# Patient Record
Sex: MALE | Race: WHITE | NOT HISPANIC OR LATINO | Employment: STUDENT | URBAN - METROPOLITAN AREA
[De-identification: names, ages, dates, MRNs, and addresses within clinical notes are randomized per-mention and may not be internally consistent; named-entity substitution may affect disease eponyms.]

---
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
## 2022-01-03 ENCOUNTER — OFFICE VISIT (OUTPATIENT)
Dept: PSYCHOLOGY | Facility: CLINIC | Age: 22
End: 2022-01-03
Payer: COMMERCIAL

## 2022-01-03 DIAGNOSIS — F31.32 BIPOLAR 1 DISORDER, DEPRESSED, MODERATE (HCC): Primary | ICD-10-CM

## 2022-01-03 DIAGNOSIS — F90.0 ATTENTION DEFICIT HYPERACTIVITY DISORDER, INATTENTIVE TYPE: ICD-10-CM

## 2022-01-03 PROCEDURE — G0177 OPPS/PHP; TRAIN & EDUC SERV: HCPCS

## 2022-01-03 PROCEDURE — G0410 GRP PSYCH PARTIAL HOSP 45-50: HCPCS

## 2022-01-03 NOTE — PSYCH
Subjective:     Patient ID: Mena Larios is a 25 y o  male  [facility] Clinical Progress Notes      Specialized Services Documentation  Therapist must complete separate progress note for each specific clinical activity in which the individual participated during the day  Group Psychotherapy  ([id number]) Daryl engaged in an open-discussion process group  The group first started with working through a handout on what is a Dialectic before finishing a handout on Goals of Skills training  Then the group talked about the good and the bad from their extended holiday weekend  The group also talked about the skills they have been learning and how practicing them at home has been going  Clint Jolly will continue with life skills and psychotherapy groups  Some progress made towards treatment   Tx Plan Objective: 1 1,1 2 Therapist:  CARLOS EDUARDO Bradley

## 2022-01-03 NOTE — PSYCH
Subjective:     Patient ID: Benja Rachel is a 25 y o  male  [facility] Clinical Progress Notes      Specialized Services Documentation  Therapist must complete separate progress note for each specific clinical activity in which the individual participated during the day  Group Psychotherapy (9:30-10:30) Benja Rachel was present for this group on making changes  The group read the poem, An Autobiography in [address] [place] [address] by  Juana Claudio and discussed how it pertains to their lives and what chapter they find themselves in currently  Participants discussed the different themes in the poem such as blaming others for problems, continuing in bad habits and the simple choice of taking a new path  Participants went on to read and discuss different thought patterns that contribute to resistance to change; delay, denial, rationalizing, avoidance, chosing not to counter negative thoughts, lacking necessary skills and payoffs for unhealthy behavior  Abi Matson was attentive during this group and participated in group discussion  Abi Matson stated that he feels he is at the point of taking a new path when it comes to change  Abi Matson discussed his decision and need to move out on his own and work due to toxic family relationships and lended support and advise to others surrounding boundaries during this group  Abi Huyen did acknowledge at times he does blame others for his situation     Tx Goal Objective: 1 1,1 2  Therapist: LENORA López

## 2022-01-03 NOTE — PSYCH
Subjective:     Patient ID: eKtan Medina is a 25 y o  male  [facility] Clinical Progress Notes      Specialized Services Documentation  Therapist must complete separate progress note for each specific clinical activity in which the individual participated during the day  GROUP PSYCHOTHERAPY ([id number]) The group completed a sleep activity that reviewed sleeping soundly suggestions  They explored positive sleep hygiene tips and addressed what to do if they cant sleep or sleep is interrupted  Members received multiple sleep logs to begin to explore and track their sleep patters and identify where they could incorporate the healthy tips  Members were educated on resources for meditations and stretches to implement into their sleep routine to release tension and sleep easier  Kashif Cervantes discussed his routines and habits when attempting to go to sleep  Discussed where some changes could be made  Ketan Medina continues to make progress towards goals through verbal and activity participation in group  Continue with psychotherapy  [facility] Objectives: 1 1, 1 2, 1 4   Therapist: Georgette Hussein MA, [facility]       Education Therapy   [id number] Ketan Medina actively shared in morning assessment and goal review  Presented as Receptive related to readiness to learn  Ketan Medina did not complete goal from last treatment day identifying hoping to gain peace  did not present with any barriers to learning  [id number] Daryl Cantor engaged throughout the treatment day  Was engaged in learning related to Illness, Medication, Aftercare and Wellness Tools  Staff utilized Verbal, Written, A/V and Demonstration teaching methods  Ketan Medina shared area of learning and set a goal for outside of program to working on 3D model and getting a good night sleeps        Tx Plan Objective: 1 1, 1 2, 1 4 Therapist:  Georgette Hussein MA, [facility]       Case Management Note    Georgette Hussein MA, KIM    Current suicide risk : Low     A case management session is not scheduled today with Jason Nakia; additionally, they did not request a CM meeting  Next scheduled session is 01/04/22  Medications changes/added/denied? No    Treatment session number: 2    Individual Case Management Visit provided today?  No    [facility] follow up physician's orders: None at this time

## 2022-01-04 ENCOUNTER — OFFICE VISIT (OUTPATIENT)
Dept: PSYCHOLOGY | Facility: CLINIC | Age: 22
End: 2022-01-04
Payer: COMMERCIAL

## 2022-01-04 DIAGNOSIS — F90.0 ATTENTION DEFICIT HYPERACTIVITY DISORDER, INATTENTIVE TYPE: ICD-10-CM

## 2022-01-04 DIAGNOSIS — F31.32 BIPOLAR 1 DISORDER, DEPRESSED, MODERATE (HCC): Primary | ICD-10-CM

## 2022-01-04 PROCEDURE — G0176 OPPS/PHP;ACTIVITY THERAPY: HCPCS

## 2022-01-04 PROCEDURE — G0410 GRP PSYCH PARTIAL HOSP 45-50: HCPCS

## 2022-01-04 PROCEDURE — G0177 OPPS/PHP; TRAIN & EDUC SERV: HCPCS

## 2022-01-04 NOTE — PSYCH
Subjective:     Patient ID: Ever Awan is a 25 y o  male  [facility] Clinical Progress Notes      Specialized Services Documentation  Therapist must complete separate progress note for each specific clinical activity in which the individual participated during the day  Group Psychotherapy ([id number]) Rosenda Andrade engaged in a group where we discussed the importance of movement in regard to our holistic health and wellness  Talking about how mental health and physical health are connected  After the processing Daryl engaged in a physical activity of chair yoga focusing on Mindfulness and body awareness  Rosenda nAdrade will continue with life skills and psychotherapy groups  Good progress made towards treatment   Tx Plan Objective: 1 1,1 2 Therapist:  CARLOS EDUARDO May

## 2022-01-04 NOTE — PSYCH
Subjective:     Patient ID: Benja Rachel is a 25 y o  male  [facility] Clinical Progress Notes      Specialized Services Documentation  Therapist must complete separate progress note for each specific clinical activity in which the individual participated during the day  Allied Therapy   [id number]--  Benja Rachel participated in [facility] group focused on engaging in a meaningful activity to connect with other group members in a safe environment  Group members took turns to draw a category from a deck (I e  A song I listen to get me motivated in the morning) and had to share a song with the group  Group engaged in meaningful discussion about the song such as likes, dislikes, and prompted questions from the therapist for further reflection  Benja Rachel engaged in  music listening and group discussion  Abi Matson frequently participated within group and appeared engaged as observed by his commentary and physiological responses to the music (I e  bopping his head, tapping toes, etc )  Some effort noted toward treatment goal  Continue AT to encourage the development and practice of utilizing mindfulness techniques to alleviate symptoms and support wellness    Tx Plan Objective: 1 1,  1 2, & 1 4      Therapist:  YOHAN Alford

## 2022-01-04 NOTE — PSYCH
Subjective:     Patient ID: Abelardo Russ is a 25 y o  male  [facility] Clinical Progress Notes      Specialized Services Documentation  Therapist must complete separate progress note for each specific clinical activity in which the individual participated during the day  GROUP PSYCHOTHERAPY (9088-8377)   The group engaged in open discussions that allowed for conversations on the following topics; education on locus of control, navigating situations that feel out of control, managing negative thoughts, addressing perception of normal and being healthy and regaining control over life  Sherron Nazario shared that he continues to have frustrations with his mom and living situation  Other members encouraged kindness and making moves to move out to improve the relationships  Encouraged to decrease the attention on things he can't control and work on the things he can  Sherron Nazario demonstrated progress towards goals and objectives through group participation  Continue with psychotherapy  [facility] Objectives: 1 1, 1 2, 1 4   Therapist: Carlos Montgomery MA, [place]     Education Therapy   [id number] Abelardo Russ actively shared in morning assessment and goal review  Presented as Receptive related to readiness to learn  Abelardo Russ did complete goal from last treatment day identifying gaining sleep and rest  did not present with any barriers to learning  [id number] Daryl Cantor engaged throughout the treatment day  Was engaged in learning related to Illness, Medication, Aftercare and Wellness Tools  Staff utilized Verbal, Written, A/V and Demonstration teaching methods  Abelardo Russ shared area of learning and set a goal for outside of program to mediation and finish 3D model        Tx Plan Objective: 1 1, 1 2, 1 4 Therapist:  Carlos Montgomery MA, [place]         Case Management Note    Carlos Montgomery MA, [place]    Current suicide risk : Low     (7849-7197) Sherron Nazario stated that he has been doing well and enjoying program  Darío Hopkins he has been working on his resume in order to get a job after program is over  Medications changes/added/denied? No    Treatment session number: 3    Individual Case Management Visit provided today?  Yes     [facility] follow up physician's orders: None at this time

## 2022-01-05 ENCOUNTER — OFFICE VISIT (OUTPATIENT)
Dept: PSYCHIATRY | Facility: CLINIC | Age: 22
End: 2022-01-05
Payer: COMMERCIAL

## 2022-01-05 ENCOUNTER — OFFICE VISIT (OUTPATIENT)
Dept: PSYCHOLOGY | Facility: CLINIC | Age: 22
End: 2022-01-05
Payer: COMMERCIAL

## 2022-01-05 DIAGNOSIS — F31.32 BIPOLAR 1 DISORDER, DEPRESSED, MODERATE (HCC): Primary | ICD-10-CM

## 2022-01-05 DIAGNOSIS — F90.0 ATTENTION DEFICIT HYPERACTIVITY DISORDER, INATTENTIVE TYPE: ICD-10-CM

## 2022-01-05 PROCEDURE — G0176 OPPS/PHP;ACTIVITY THERAPY: HCPCS

## 2022-01-05 PROCEDURE — 99214 OFFICE O/P EST MOD 30 MIN: CPT | Performed by: NURSE PRACTITIONER

## 2022-01-05 PROCEDURE — G0177 OPPS/PHP; TRAIN & EDUC SERV: HCPCS

## 2022-01-05 PROCEDURE — G0410 GRP PSYCH PARTIAL HOSP 45-50: HCPCS

## 2022-01-05 RX ORDER — QUETIAPINE FUMARATE 50 MG/1
50 TABLET, FILM COATED ORAL
COMMUNITY
End: 2022-06-02

## 2022-01-05 RX ORDER — FLUOXETINE 10 MG/1
10 CAPSULE ORAL DAILY
COMMUNITY
End: 2022-01-12 | Stop reason: SDUPTHER

## 2022-01-05 NOTE — PSYCH
Subjective:     Patient ID: Cm Hare is a 25 y o  male  [facility] Clinical Progress Notes      Specialized Services Documentation  Therapist must complete separate progress note for each specific clinical activity in which the individual participated during the day  Group Psychotherapy (4217-1564) Charly Staley engaged in an education group on medication and the importance in understanding our medication, creating a schedule for taking our medication, and knowing the possible side effects to our medication   After, processing the power point presentation on medications Daryl then engaged in a mindfulness activity of Progressive muscle relaxation   Charly Staley will continue with life skills and psychotherapy groups   Some progress made towards treatment    Tx Plan Objective: 1 1,1 2 Therapist:  CARLOS EDUARDO Doan

## 2022-01-05 NOTE — PSYCH
Subjective:     Patient ID: Benja Rachel is a 25 y o  male  [facility] Clinical Progress Notes      Specialized Services Documentation  Therapist must complete separate progress note for each specific clinical activity in which the individual participated during the day  GROUP PSYCHOTHERAPY (4095-2522)   The group engaged in open discussions that allowed for conversations on the following topics; managing high and low emotions, recognizing that emotions are a regular part of living and analyzing coping skills to manage early warning signs  Members were asked to rate themselves on a scale from 0-10 (0 = lowest, 10 = best) to identify where they are at in their wellness journey  Then asked what would increasing to the next number look like  Abi Matson shared that he would rate himself at a 5  He was inattentive and sleeping within the group  Had to be prompted to group discussion  Abi Matson demonstrated progress towards goals and objectives through group participation  Continue with psychotherapy  [facility] Objectives: 1 1, 1 2, 1 4   Therapist: Andrew Renteria [place]       Education Therapy   [id number] Benja Rachel quietly shared in morning assessment and goal review  Presented as Other uninterested related to readiness to learn  Benja Rachel did complete goal from last treatment day identifying gaining hope  did not present with any barriers to learning  [id number] Daryl Cantor engaged throughout the treatment day  Was engaged in learning related to Illness, Medication, Aftercare and Wellness Tools  Staff utilized Verbal, Written, A/V and Demonstration teaching methods  Benja Rachel shared area of learning and set a goal for outside of program to use wise mind  Tx Plan Objective: 1 1, 1 2, 1 4 Therapist:  Triny Abreu MA, [place]       Case Management Note    Triny Abreu MA, [place]    Current suicide risk : Low     ([id number]) Daryl shared that he feels program is going well   He would like to continue to work on self love and decreasing his negative self talk  He continues to work on his resume  He brought up discharge and his plan to get a job once he is done  He stated he will reach out to his therapist to schedule follow up appointments  Medications changes/added/denied? No    Treatment session number: 4    Individual Case Management Visit provided today?  Yes     [facility]ations follow up physician's orders: None at this time

## 2022-01-05 NOTE — PSYCH
PHP MEDICATION MANAGEMENT NOTE        [facility]    Name and Date of Birth:  Zaina Lord 25 y o  2000 MRN: [id number]    Date of Visit: January 5, 2022    Allergies   Allergen Reactions    Penicillins Rash     SUBJECTIVE:    Francisco Verduzco is seen today for a follow up for Bipolar Disorder  He reports that he has improved slightly since beginning PHP  States that he has found the group support helpful coping with anger and depression  Currently rates depression is 3/10  Denies anxiety  States he has been taking his medications regularly  Reviewed patient's current medications with the patient and reviewed last note from Dr Jonathan Marie, patients provider  Updated current medication list   Patient denies any thoughts of self-harm or suicide  He denies any side effects from medications  PLAN:  Continue Strattera 40 mg p o  Daily  Continue Wellbutrin  mg p o  Daily  Continue Prozac 30 mg p o   Daily  Continue Tegretol 200 mg p o  B i d   Continue quetiapine 50 mg p o  HS  Medication list updated  Aware of 24 hour and weekend coverage for urgent situations accessed by calling [facility] main practice number  Continue partial hospitalization program    Diagnoses and all orders for this visit:    Bipolar 1 disorder, depressed, moderate ([facility] [place] [address] )        Current Outpatient Medications on File Prior to Visit   Medication Sig Dispense Refill    atoMOXetine (STRATTERA) 40 mg capsule TAKE 1 CAPSULE BY MOUTH EVERY DAY 90 capsule 0    buPROPion (WELLBUTRIN XL) 300 mg 24 hr tablet TAKE 1 TABLET BY MOUTH EVERY DAY 90 tablet 0    carBAMazepine (TEGretol XR) 200 mg 12 hr tablet Take 1 tablet (200 mg total) by mouth 2 (two) times a day 60 tablet 0    carBAMazepine (TEGretol XR) 200 mg 12 hr tablet Take 1 tablet (200 mg total) by mouth 2 (two) times a day (Patient not taking: Reported on 5/12/2021) 180 tablet 0    carBAMazepine (TEGretol XR) 200 mg 12 hr tablet Take 1 tablet (200 mg total) by mouth 2 (two) times a day 180 tablet 0    FLUoxetine (PROzac) 20 mg capsule TAKE 1 CAPSULE BY MOUTH EVERY DAY 90 capsule 0    guanFACINE (TENEX) 2 MG tablet TAKE 1 TABLET BY MOUTH AT BEDTIME 30 tablet 1    QUEtiapine (SEROquel) 100 mg tablet Take 1 tablet (100 mg total) by mouth daily at bedtime 30 tablet 1    QUEtiapine (SEROquel) 100 mg tablet TAKE 1 TABLET BY MOUTH DAILY AT BEDTIME 90 tablet 0    sulfamethoxazole-trimethoprim (BACTRIM DS) 800-160 mg per tablet Take 1 tablet by mouth every 12 (twelve) hours for 7 days 14 tablet 0    [DISCONTINUED] amphetamine-dextroamphetamine (ADDERALL) 20 mg tablet Take 1 tablet (20 mg total) by mouth dailyMax Daily Amount: 20 mg (Patient not taking: Reported on 5/4/2021) 30 tablet 0    [DISCONTINUED] amphetamine-dextroamphetamine (ADDERALL) 20 mg tablet Take 1 tablet (20 mg total) by mouth dailyMax Daily Amount: 20 mg (Patient not taking: Reported on 7/7/2021) 30 tablet 0    [DISCONTINUED] desvenlafaxine succinate (PRISTIQ) 50 mg 24 hr tablet Take 1 tablet (50 mg total) by mouth daily 30 tablet 2     No current facility-administered medications on file prior to visit  Psychotherapy Provided:     Individual psychotherapy provided: Yes  Counseling was provided during the session today for 16 minutes  Supportive counseling provided  HPI ROS Appetite Changes and Sleep:     He reports normal sleep, adequate appetite, adequate energy level   Denies homicidal ideation, denies suicidal ideation    Review Of Systems:      General emotional problems, decreased functioning and relationship problems   Personality no change in personality   Constitutional negative   ENT negative   Cardiovascular negative   Respiratory negative   Gastrointestinal negative   Genitourinary negative   Musculoskeletal negative   Integumentary negative   Neurological negative   Endocrine negative   Other Symptoms none, all other systems are negative     Mental Status Evaluation:    Appearance Adequate hygiene and grooming   Behavior calm and cooperative   Mood depressed  Depression Scale - 3 of 10 (0 = No depression)  Anxiety Scale - 0 of 10 (0 = No anxiety)   Speech Normal rate and volume   Affect mood-congruent and constricted   Thought Processes Goal directed and coherent   Thought Content Does not verbalize delusional material   Associations Tightly connected   Perceptual Disturbances Denies hallucinations and does not appear to be responding to internal stimuli   Risk Potential Suicidal/Homicidal Ideation - No evidence of suicidal or homicidal ideation and patient does not verbalize suicidal or homicidal ideation  Risk of Violence - No evidence of risk for violence found on assessment  Risk of Self Mutilation - No evidence of risk for self mutilation found on assessment   Orientation oriented to person, place, time/date and situation   Memory recent and remote memory grossly intact   Consciousness alert and awake   Attention/Concentration attention span and concentration are age appropriate   Insight partial   Judgement partial   Muscle Strength and Gait normal muscle strength and normal muscle tone, normal gait/station and normal balance   Motor Activity no abnormal movements   Language no difficulty naming common objects, no difficulty repeating a phrase, no difficulty writing a sentence   Fund of Knowledge adequate knowledge of current events  adequate fund of knowledge regarding past history  adequate fund of knowledge regarding vocabulary      Past Psychiatric History Update:     Inpatient Psychiatric Admission Since Last Encounter:   no  Suicide Attempt Or Self Mutilation Since Last Encounter:   no  Incidence of Violent Behavior Since Last Encounter:   no    Traumatic History Update:     New Onset of Abuse Since Last Encounter:   no  Traumatic Events Since Last Encounter:   no    Past Medical History:    Past Medical History:   Diagnosis Date    ADHD (attention deficit hyperactivity disorder)     B[place] disorder ([place] [address] )     Bipolar disorder ([place] [address] ) 2014     Past Medical History Pertinent Negatives:   Diagnosis Date Noted    Head injury 12/29/2021    Seizures ([place] [address] ) 12/29/2021     Past Surgical History:   Procedure Laterality Date    EYE SURGERY  2006    Left eye    MYRINGOTOMY W/ TUBES Bilateral 2005    TYMPANOSTOMY TUBE PLACEMENT       Allergies   Allergen Reactions    Penicillins Rash     Substance Abuse History:    Social History     Substance and Sexual Activity   Alcohol Use Never     Social History     Substance and Sexual Activity   Drug Use Never     Social History:    Social History     Socioeconomic History    Marital status: Single     Spouse name: Not on file    Number of children: Not on file    Years of education: Not on file    Highest education level: High school graduate   Occupational History    Occupation: Student   Tobacco Use    Smoking status: Never Smoker    Smokeless tobacco: Never Used   Vaping Use    Vaping Use: Never used   Substance and Sexual Activity    Alcohol use: Never    Drug use: Never    Sexual activity: Never   Other Topics Concern    Not on file   Social History Narrative    Not on file     Social Determinants of Health     Financial Resource Strain: Not on file   Food Insecurity: No Food Insecurity    Worried About Running Out of Food in the Last Year: Never true    [address] in the Last Year: Never true   Transportation Needs: No Transportation Needs    Lack of Transportation (Medical): No    Lack of Transportation (Non-Medical):  No   Physical Activity: Not on file   Stress: Not on file   Social Connections: Not on file   Intimate Partner Violence: Not on file   Housing Stability: Not on file     Family Psychiatric History:     Family History   Problem Relation Age of Onset    Depression Mother     Bipolar disorder Father     Suicide Attempts Father     Anxiety disorder Father  Alcohol abuse Maternal Grandfather     Alcohol abuse Paternal Grandfather     Multiple myeloma Maternal Grandmother     No Known Problems Paternal Grandmother      History Review: The following portions of the patient's history were reviewed and updated as appropriate: allergies, current medications, past family history, past medical history, past social history, past surgical history and problem list     OBJECTIVE:     Vital signs in last 24 hours: There were no vitals filed for this visit  Laboratory Results: I have personally reviewed all pertinent laboratory/tests results  Medications Risks/Benefits:      Risks, Benefits And Possible Side Effects Of Medications:    Discussed risks and benefits of treatment with patient including risk of suicidality, serotonin syndrome, increased QTc interval and SIADH related to treatment with antidepressants; Risk of induction of manic symptoms in certain patient populations, risk of parkinsonian symptoms, metabolic syndrome, tardive dyskinesia and neuroleptic malignant syndrome related to treatment with antipsychotic medications, Reduction in seizure threshold related to treatment with bupropion  and risk of liver impairment and agranulocytosis related to treatment with Tegretol     Controlled Medication Discussion:     Siena Reid has been filling controlled prescriptions on time as prescribed according to [address]  01/05/22    This note was shared with patient

## 2022-01-05 NOTE — PSYCH
Subjective:     Patient ID: Liane Faulkner is a 25 y o  male  [facility] Clinical Progress Notes      Specialized Services Documentation  Therapist must complete separate progress note for each specific clinical activity in which the individual participated during the day  Allied Therapy   [id number]- Liane Faulkner actively shared in [facility] group focused on the balanced  Group discussion pertained to how to live out of our balanced mind and identifying signs that our brain is being unbalanced (underthinking, overthinking, underfeeling, overfeeling)  Liane Faulkner  engaged in group by listening to music from an emotional mind perspective vs  reasoning perspective and learning how to analyze a situation from a balance mind perspective  Liane Faulkner also processed how his mind is unbalanced and what steps they can take to become more balanced  Group explored practice of active song listening, group processing and discussion, and journaling  Liane Faulkner presented as tired during the group as observed by him slouching over and placing his head in his hands as well as at times keeping his eyes closed  Some effort noted toward treatment goal   Continue AT to encourage the development and practice of mindfulness strategies to alleviate symptoms and support wellness    Tx Plan Objective: 1 1, 1 2, 1 4     Therapist:  YOHAN Gary

## 2022-01-06 ENCOUNTER — OFFICE VISIT (OUTPATIENT)
Dept: PSYCHOLOGY | Facility: CLINIC | Age: 22
End: 2022-01-06
Payer: COMMERCIAL

## 2022-01-06 DIAGNOSIS — F90.0 ATTENTION DEFICIT HYPERACTIVITY DISORDER, INATTENTIVE TYPE: ICD-10-CM

## 2022-01-06 DIAGNOSIS — F31.32 BIPOLAR 1 DISORDER, DEPRESSED, MODERATE (HCC): Primary | ICD-10-CM

## 2022-01-06 PROCEDURE — G0410 GRP PSYCH PARTIAL HOSP 45-50: HCPCS

## 2022-01-06 PROCEDURE — G0177 OPPS/PHP; TRAIN & EDUC SERV: HCPCS

## 2022-01-06 NOTE — PSYCH
Subjective:     Patient ID: Yani Bravo is a 25 y o  male  [facility] Clinical Progress Notes      Specialized Services Documentation  Therapist must complete separate progress note for each specific clinical activity in which the individual participated during the day  GROUP PSYCHOTHERAPY  ([id number])  CPS Roxane Kennedy share his life story as he co-led this session  Group encouraged power of learning about self, accepting illness and personal responsibility in recovery  Community resources reviewed in addition to personal resources like the affirmations  Yani Bravo  attentively listened to [facility] presentation  Progress toward goals noted  Continue psychotherapy to encourage self -awareness and healthy engagement of supports  TX Plan Objectives: 1 1, 1 2, 1 4   Therapist: YOHAN [place]       Education Therapy   7880-3641 Yani Bravo engaged throughout the treatment day  Was engaged in learning related to Illness, Medication, Aftercare and Wellness Tools  Staff utilized Verbal, Written, A/V and Demonstration teaching methods  Yani Bravo shared area of learning and set a goal for outside of program to complete self-care and deal with parents        Tx Plan Objective: 1 1, 1 2, 1 4   Therapist:  YOHAN [place] PCP for Im[facility]

## 2022-01-06 NOTE — PSYCH
Subjective:     Patient ID: Liane Faulkner is a 25 y o  male  [facility] Clinical Progress Notes      Specialized Services Documentation  Therapist must complete separate progress note for each specific clinical activity in which the individual participated during the day  Group Psychotherapy (9117-3649) Daryl rodriguez psychotherapy group focused on Radical Acceptance  Followed by a worksheet exploring ways to respond when a serious problem comes into your life  Group discussed impact on treatment and ways to increase acceptance in different areas of our lives  Turning the mind, willingness, and half-smiling /willing hands were also handouts given that went along with the distress tolerance topic  Progressive muscle relaxation exercise aided in closing the group  Good effort noted toward treatment goals  Continue psychotherapy to explore wellness strategies and encourage personal practice  Tx Plan Objective: 1 1,1 2 Therapist:  CARLOS EDUARDO Howell    Education Therapy   6611-2356 Liane Faulkner actively shared in morning assessment and goal review  Presented as Receptive related to readiness to learn  Liane Faulkner did complete goal from last treatment day identifying gaining hope  did not present with any barriers to learning       Tx Plan Objective: 1 1,1 2 Therapist:  CARLOS EDUARDO Howell

## 2022-01-06 NOTE — PSYCH
Subjective:     Patient ID: Alejandro Almaguer is a 25 y o  male  [facility] Clinical Progress Notes      Specialized Services Documentation  Therapist must complete separate progress note for each specific clinical activity in which the individual participated during the day  Other (5495-8699) Spoke with Rea Watkins from [facility] with a contact number [phone]  Alejandro Almaguer was authorized an additional 6 days from 01/07/22 to 01/14/21 with an authorization code of remaining [id number]  GROUP PSYCHOTHERAPY (2546-3186) The group engaged in a mindful activity in preparation for discussions on Cognitive Fusions  Members were educated on the definition of fusions, about a myriad of cognitive fusions and defusion techniques when errors are identified  Members discussed their understanding and personal experiences with the 6 different types of cognitive fusions; rules, reasons, judgements, past, future and self  Daryl volunteer to read from the list of fusions  He hesitantly engaged in the exercises  Alejandro Almaguer continues to make progress towards goals through verbal participation in group  Continue with psychotherapy  TX Plan Objectives: 1 1, 1 2, 1 4  Therapist: Elenita Badillo MA, [place]      Case Management Note    Elenita Badillo MA, [place]    Current suicide risk : Low     (2243-4994) Daryl shared that he will need assistance with finding a new therapist  He was informed that he was discharged from his last person for non-compliance  Karolyn Kenyon stated he had one other option then may need up finding others  He shared that he is not yet ready for discharge and would like to continue with program  CM informed Daryl of extended authorization  Medications changes/added/denied? No    Treatment session number: 5    Individual Case Management Visit provided today?  Yes     [facility]ations follow up physician's orders: None at this time

## 2022-01-07 ENCOUNTER — OFFICE VISIT (OUTPATIENT)
Dept: PSYCHOLOGY | Facility: CLINIC | Age: 22
End: 2022-01-07
Payer: COMMERCIAL

## 2022-01-07 DIAGNOSIS — F90.0 ATTENTION DEFICIT HYPERACTIVITY DISORDER, INATTENTIVE TYPE: ICD-10-CM

## 2022-01-07 DIAGNOSIS — F31.32 BIPOLAR 1 DISORDER, DEPRESSED, MODERATE (HCC): Primary | ICD-10-CM

## 2022-01-07 PROCEDURE — G0177 OPPS/PHP; TRAIN & EDUC SERV: HCPCS

## 2022-01-07 PROCEDURE — G0176 OPPS/PHP;ACTIVITY THERAPY: HCPCS

## 2022-01-07 PROCEDURE — G0410 GRP PSYCH PARTIAL HOSP 45-50: HCPCS

## 2022-01-07 NOTE — PSYCH
Subjective:     Patient ID: Patria Ramirez is a 25 y o  male  [facility] Clinical Progress Notes      Specialized Services Documentation  Therapist must complete separate progress note for each specific clinical activity in which the individual participated during the day  GROUP PSYCHOTHERAPY (1302-0924) Members engaged in a therapeutic activity while having a safe and non-judgmental place to express their feelings, values and experiences  Members were able to increase their understanding of their own strengths and how people perceive them  Group experiences promoted personal development, identifying strengths through other peoples eyes and creating a positive foundation when things seem tough  The group participated in group discussions about personal disclosures, offering feedback and encouraging others to think in different perspectives  Vu Ontiveros volunteered to have strengths identified about him  He offered encouragement to others when explaining his cards  He attempted to justify his own choices and excuse why his cards didn't get picked  Vu Ontiveros continues to demonstrate insight and progress through activity participation and verbal disclosures in group  Continue with psychotherapy  TX Plan Objectives: 1 1, 1 2, 1 4   Therapist: Brandyn Celis MA, [place]       Case Management Note    Brandyn Celis MA, [place]    Current suicide risk : Low     ([id number]) Provided Daryl with list of outpatient therapists, in case the one he was going to contact fell through  Medications changes/added/denied? No    Treatment session number: 6    Individual Case Management Visit provided today?  Yes     [facility]ations follow up physician's orders: None at this time

## 2022-01-07 NOTE — PSYCH
Subjective:     Patient ID: Patria Ramirez is a 25 y o  male  [facility] Clinical Progress Notes      Specialized Services Documentation  Therapist must complete separate progress note for each specific clinical activity in which the individual participated during the day  GROUP PSYCHOTHERAPY ([id number]) The group engaged in the wellness assessment, which evaluates progress on several different areas of wellness/wellbeing: physical, emotional, cognitive, vocational, social and spiritual  Clients rated their progress and discussed areas that need work  By completing and discussing areas of progress and challenges, members are connected and reminded that, in their mental health struggle, they are not alone  Topics of discussion revolved around changing perspectives, flow of progress and perfectionism  Vu Yemeni continues to make progress towards goals through participation in group activity and personal disclosures  Continue with psychotherapy  [address] [place] Objectives: 1 1, 1 2, 1 4  Therapist: CARLOS EDUARDO Forbes    Education Therapy   [id number] Patria Ramirez actively shared in morning assessment and goal review  Presented as Receptive related to readiness to learn  Patria Ramirez did complete goal from last treatment day identifying gaining support  did not present with any barriers to learning  [id number] Daryl Cantor engaged throughout the treatment day  Was engaged in learning related to Illness, Medication, Aftercare and Wellness Tools  Staff utilized Verbal, Written, A/V and Demonstration teaching methods  Patria Ramirez shared area of learning and set a goal for outside of program to work on resume        Tx Plan Objective: 1 1,1 2 Therapist:  CARLOS EDUARDO Forbes

## 2022-01-07 NOTE — PSYCH
Subjective:     Patient ID: Giselle Razo is a 25 y o  male  [facility] Clinical Progress Notes      Specialized Services Documentation  Therapist must complete separate progress note for each specific clinical activity in which the individual participated during the day  Allied Therapy  [id number]- Giselle Razo actively shared in [facility] group that focused on identifying negative coping skills, categorizing positive coping skills, and identifying which category to use when engaging in negative coping skills  Group explored practice of identifying what negative coping skills we use in our daily lives, the steps that need to be taken to replace the negative coping skills, categorizing positive coping skills, and analyzing when they engage in a negative coping skill what category of coping skills do they need to utilize  To explore this topic, the group engaged in mann analysis, self-reflection, group discussion, and creating a group visual aid  Group also discussed what they need to do to keep themselves on track with utilizing positive coping skills (I e  accountability, creating a schedule, etc ) Giselle Razo asked many questions throughout group related to his specific scenarios and experiences in his personal life  Daryl demonstrated difficulty identifying 3 positive coping skills  He stated he does not know or feel like he has any  He came up with cutting boxes as a tension release and when prompted he was able to identify listening music [therapist provided him prompts to have him recall when he shared about how he loves sitting and listening to music]  This is an encouraged area for Daryl to begin exploring to figure out which categories of positive coping skills he needs to begin experimenting with to figure out what works best for him     Some effort made toward treatment goal   Continue AT to encourage the development of positive coping skills and pleasurable activities to alleviate symptoms and support wellness    Treatment Objectives: 1 1, 1 2, & 1 4     Therapist:  YOHAN Morales

## 2022-01-10 ENCOUNTER — OFFICE VISIT (OUTPATIENT)
Dept: PSYCHOLOGY | Facility: CLINIC | Age: 22
End: 2022-01-10
Payer: COMMERCIAL

## 2022-01-10 DIAGNOSIS — F31.32 BIPOLAR 1 DISORDER, DEPRESSED, MODERATE (HCC): Primary | ICD-10-CM

## 2022-01-10 DIAGNOSIS — F90.0 ATTENTION DEFICIT HYPERACTIVITY DISORDER, INATTENTIVE TYPE: ICD-10-CM

## 2022-01-10 PROCEDURE — G0410 GRP PSYCH PARTIAL HOSP 45-50: HCPCS

## 2022-01-10 PROCEDURE — G0176 OPPS/PHP;ACTIVITY THERAPY: HCPCS

## 2022-01-10 PROCEDURE — G0177 OPPS/PHP; TRAIN & EDUC SERV: HCPCS

## 2022-01-10 NOTE — PSYCH
Subjective:     Patient ID: Rk Akbar is a 25 y o  male  [facility] Clinical Progress Notes      Specialized Services Documentation  Therapist must complete separate progress note for each specific clinical activity in which the individual participated during the day  GROUP PSYCHOTHERAPY ([id number])   The group engaged in open discussions that allowed for conversations on the following topics; personal disclosures, coping with extreme situations with intense emotions, recognizing passing personal boundaries to help other people and  guilt associated with making changes due to cultural expectations  Daryl shared detail information to help soothe the worry of another member  He did not participate further than offering support  Solange Garcia demonstrated limited progress towards goals and objectives through minimal group participation  Continue with psychotherapy  [place] Plan Objectives: 1 1, 1 2, 1 4   Therapist: Meet Mcpherson MA, [place]       Case Management Note    Meet Mcpherson MA, [place]    Current suicide risk : Low     ([id number]) Solange Garcia reported difficulties with transportation  CM followed up to find out calls are being made to Solange Garcia but they are going to voicemail  CM encouraged Daryl to check phone setting to see if unknown calls were being blocked  Received mother's number in attempt to keep transportation services  Reminded that if issues continue to occur he may need to find an alternative way into program   Reports that he has not worked on his resume and his mother is going to help him  He reports that he continues to argue with his parents due to the need to change  He is unable to identify what change needs to occur or what he is needing to be able to make a recommendation  Encouraged for homework to identify his needs in order to make a request to come to a compromise with his parents    Solange Garcia presented as withdrawn when discussing the possibility of his responsibility and communicating for a compromise  Medications changes/added/denied? No    Treatment session number: 7    Individual Case Management Visit provided today?  Yes     [facility] follow up physician's orders: None at this time

## 2022-01-10 NOTE — PSYCH
Subjective:     Patient ID: Xenia Orourke is a 25 y o  male  [facility] Clinical Progress Notes      Specialized Services Documentation  Therapist must complete separate progress note for each specific clinical activity in which the individual participated during the day  Group Psychotherapy ([id number]) Bree Tena was engaged in a psychoeducation group focused on setting appropriate boundaries to improve overall wellness and to achieve more internal happiness  A brief richard talk speaker started the group followed by a personal boundary worksheet  Group discussed different types of boundaries as followed: Porous Boundaries, Healthy Boundaries, and Rigid Boundaries  Group then engaged in open discussion on areas were they can improve boundaries and also apply mindfulness while communicating their new set of boundaries to their loved ones or friends  Bree Tena will continue with life skills and psychotherapy groups  Some progress made towards treatment  Tx Plan Objective: 1 1,1 2 Therapist:  CARLOS EDUARDO Clarke    Education Therapy   [id number] Xenia Orourke quietly shared in morning assessment and goal review  Presented as Receptive related to readiness to learn  Xenia Orourke did not complete goal from last treatment day identifying hoping to gain responsibility  did not present with any barriers to learning  [id number] Daryl Cantor engaged throughout the treatment day  Was engaged in learning related to Illness, Medication, Aftercare and Wellness Tools  Staff utilized Verbal, Written, A/V and Demonstration teaching methods  Xenia Orourke shared area of learning and set a goal for outside of program to keep positive mindset with parents      Tx Plan Objective: 1 1,1 2 Therapist:  CARLOS EDUARDO Clarke

## 2022-01-10 NOTE — PSYCH
Subjective:     Patient ID: Patria Ramirez is a 25 y o  male  [facility] Clinical Progress Notes      Specialized Services Documentation  Therapist must complete separate progress note for each specific clinical activity in which the individual participated during the day  Allied Therapy   [id number]- Patria Ramirez actively shared in [facility], [place] group focused on the use of dialectic statements to decrease emotional intensity and black and white thinking as well as acknowledging that two opposing opinions or feelings can co-exist and both can be true  Patria Ramirez engaged in group by actively listening to music, writing their own lyrics, creating artwork, group discussion, self-reflection,  and taking notes  Group explored practice of writing their own song lyrics to a well known tune to explore their own dialectic thoughts they can utilize for positive self-talk  Group member also listened to the same piece of music two times  The first time they were to draw a visual representation of an emotion they believed the piece was representing  The second time they listened to the piece they were to identify a different they felt when listening to the piece  Group began to identify when they felt both of these emotions at the same time and a positive dialectic statement they could utilize for the scenario  Patria Ramirez shared their self-talk helps them realize they do not have to be all of one thing and a robot and that their can be variety  Some effort noted toward treatment goal   Continue AT to encourage the development and practice of dialectic statements to  alleviate symptoms and support wellness    Tx Plan Objective: 1 1, 1 2, & 1 4  Therapist:  YOHAN Mast

## 2022-01-11 ENCOUNTER — OFFICE VISIT (OUTPATIENT)
Dept: PSYCHOLOGY | Facility: CLINIC | Age: 22
End: 2022-01-11
Payer: COMMERCIAL

## 2022-01-11 DIAGNOSIS — F90.0 ATTENTION DEFICIT HYPERACTIVITY DISORDER, INATTENTIVE TYPE: ICD-10-CM

## 2022-01-11 DIAGNOSIS — F31.32 BIPOLAR 1 DISORDER, DEPRESSED, MODERATE (HCC): Primary | ICD-10-CM

## 2022-01-11 PROCEDURE — G0176 OPPS/PHP;ACTIVITY THERAPY: HCPCS

## 2022-01-11 PROCEDURE — G0177 OPPS/PHP; TRAIN & EDUC SERV: HCPCS

## 2022-01-11 PROCEDURE — G0410 GRP PSYCH PARTIAL HOSP 45-50: HCPCS

## 2022-01-11 NOTE — PSYCH
Assessment/Plan:       Diagnoses and all orders for this visit:     Bipolar 1 disorder, depressed, moderate (HCC)     Attention deficit hyperactivity disorder, inattentive type         Subjective:      Patient ID: Alejandro Almaguer is a 25 y o  male  [facility] Treatment Plan   AREAS OF NEED: Bipolar Disorder and ADHD as evidenced by isolation, passive death wishes, helplessness, low energy and decreased motivation due to family stressors and declining performance at school  Date Initiated: 12/29/21     Strengths: "helping others and motivational"                 LONG TERM GOAL:   Date Initiated: 12/29/21  1 0 I will identify 3 ways that I am more pleasant and independent while attending [facility]  Target Date: 01/26/2022  Completion Date:         SHORT TERM OBJECTIVES:      Date Initiated: 12/29/21  1 1 I will engage in 2 coping skills daily in order to improve my distress tolerance  Revision Date: 01/11/22   Target Date: 01/11/2022  Completion Date:      Date Initiated: 12/29/21  1 2 I will verbally participate in group to learn effective communication of my emotions  Revision Date: 01/11/22   Target Date:  01/11/2022  Completion Date:     Date Initiated: 12/29/21  1 3 I will take medications as prescribed and share questions and concerns if arise  Revision Date: 01/11/22   Target Date:  01/11/2022  Completion Date:      Date Initiated: 12/29/21  1 4 I will identify 3 ways my supports can assist in my recovery and agree to staff/support contact as indicated  Revision Date: 01/11/22   Target Date:  01/11/2022  Completion Date:            7 DAY REVISION:     Date Initiated:01/11/22   1 5 I will start making a list of positive self affirmations by adding 2 new statements daily to begin reframing my mind to be kinder to myself     Revision Date:   Target Date: 01/20/22  Completion Date:    Date Initiated:01/11/22   1 6 I will call 2 new therapists a day to get set up with an intake to prepare for discharge  Revision Date:   Target Date: 01/20/22  Completion Date:        PSYCHIATRY:  Date Initiated:  12/29/21  Medication Management and Education       Revision Date: 01/11/22        1 3 Continue medication management       The person(s) responsible for carrying out the plan is Mariah Giles MD     NURSING/SYMPTOM EDUCATION:  Date Initiated: 12/29/21       1 1, 1 2  1 3, 1 4 Provide wellness/symptoms and skill education groups three to five days weekly to educate Negra Hays on signs and symptoms of diagnoses, skills to manage stressors, and medication questions that will be addressed by the treatment team         Revision date: 01/11/22        1 1,1 2,1 3,1 4,1 5 Continue to encourage Negra Hays to participate in wellness groups daily to learn about symptoms, coping strategies and warning signs to promote relapse prevention  The person(s) responsible for carrying out the plan is Star Delarosa MA and CARLOS EDUARDO Turner     PSYCHOLOGY:   Date Initiated: 12/29/21       1 1, 1 2, 1 4 Provide psychotherapy group 5 times per week to allow opportunity for Negra Hays  to explore stressors and ways of coping  Revision Date: 01/11/22   1 1,1 2,1 4,1 5  Continue to provide psychotherapy group daily to Negra Hays and encourage sharing of stressors, skills and positive change  The person(s) responsible for carrying out the plan is Noe Olson MA, [facility]     ALLIED THERAPY:   Date Initiated: 12/29/21  1 1,1 2 Engage Negra Hays in AT group 5 times daily to encourage development and use of wellness tools to decrease symptoms and promote recovery through meaningful activity  Revision Date: 01/11/22   1 1,1 2,1 5 Continue to engage Negra Hays to participate in AT group to practice wellness tools within program and transfer to home sharing successes and barriers through healthy task involvement         The person(s) responsible for carrying out the plan is Bob Love [facility] and Lazaro Sweeney [facility]     CASE MANAGEMENT:   Date Initiated: 12/29/21      1 0 This  will meet with Rain Casey  3-4 times weekly to assess treatment progress, discharge planning, connection to community supports and UR as indicated  Revision Date: 01/11/22   1 0 Continue to meet with Rain Casey 3-4 times weekly to assess growth, work toward goals, continued treatment needs, dc planning and use of supports  The person(s) responsible for carrying out the plan is Luz Medina MA, [place]     TREATMENT REVIEW/COMMENTS:      DISCHARGE CRITERIA: Identify 3 signs of progress and complete relapse prevention plan  DISCHARGE PLAN: Connect with identified outpatient providers     Estimated Length of Stay: 10 treatment days       Diagnosis and Treatment Plan explained to Jhoana Mtz relates understanding diagnosis and is agreeable to Treatment Plan             CLIENT COMMENTS / Please share your thoughts, feelings, need and/or experiences regarding your treatment plan: _____________________________________________________________________________________________________________________________________________________________________________________________________________________________________________________________________________________________________________________ Date/Time: ______________      Patient Signature: _________________________________      Date/Time: ______________       Signature: _________________________________          Date/Time: ______________

## 2022-01-11 NOTE — PSYCH
Subjective:     Patient ID: Cm Hare is a 25 y o  male  [facility] Clinical Progress Notes      Specialized Services Documentation  Therapist must complete separate progress note for each specific clinical activity in which the individual participated during the day  Group Psychotherapy   [id number]-- Cm Hare actively shared and participated in [facility] group focused on learning the key features of the  6 stages to change, identifying aspects to preparation for change, analyzing songs to think about where the narrator of the song is within the cycle of change, and sharing personal reflections within group discussion  Cm Hare shared in group regarding where he believed a narrator of a song and what phase they may be in  Charly Lemmings attempted to utilize evidence to support where he believed the narrator was in  Some effort noted toward treatment goal   Continue AT to encourage the development and practice of analyzing the change cycle to  alleviate symptoms and support wellness       Tx Plan Objective: 1 1, 1 2, 1 4   Therapist:  YOHAN Win

## 2022-01-11 NOTE — PSYCH
Subjective:     Patient ID: Sergey Schaeffer is a 25 y o  male  [facility] Clinical Progress Notes      Specialized Services Documentation  Therapist must complete separate progress note for each specific clinical activity in which the individual participated during the day  GROUP PSYCHOTHERAPY ([id number])   The group was educated on S M A R T E R  goal criteria, research about forming new habits and provided tips on implementing goals  Members practiced creating goals based on the criteria and their aspirations  The members shared their initial goal and the adjusted goal to reflect SMART goal setting  Members engaged in recreating goals to follow the SMART goal outline  Antoinette Gimenez continues to make progress towards goals through verbal participation in group  Continue with psychotherapy  [address] [facility] Objectives: 1 1, 1 2, 1 4   Therapist: Digna Lopez MA, [place]     Education Therapy   [phone] Sergey Schaeffer actively shared in morning assessment and goal review  Presented as Uncooperative related to readiness to learn  Sergey Schaeffer did complete goal from last treatment day identifying gaining hope  did present with any barriers to learning  [phone] Daryl Cantor engaged throughout the treatment day  Was engaged in learning related to Illness, Medication, Aftercare and Wellness Tools  Staff utilized Verbal, Written, A/V and Demonstration teaching methods  Sergey Schaeffer shared area of learning and set a goal for outside of program to work on self-care and staying calm  Tx Plan Objective: 1 1, 1 2, 1 4 Therapist:  Digna Lopez MA, [place]       Case Management Note    Digna Lopez MA, [place]    Current suicide risk : Low     ([id number]) Discussed additional goals to add to treatment plan  He would like to work on being kinder to himself  Discussed discharge plan  Encouraged to begin calling for outpatient therapist to prepare for discharge    He stated his mom was going to help him with this task  Medications changes/added/denied? No    Treatment session number: 8    Individual Case Management Visit provided today?  Yes     [facility] follow up physician's orders: None at this time

## 2022-01-11 NOTE — PSYCH
Subjective:     Patient ID: Rk Akbar is a 25 y o  male  [facility] Clinical Progress Notes      Specialized Services Documentation  Therapist must complete separate progress note for each specific clinical activity in which the individual participated during the day  Allied Therapy   [id number] Rk Akbar actively shared in [facility] group focused on universal human needs  Solange Garcia was observed to be engaged in therapist led discussion on what the different categories of universal human needs are  Group engaged in mann analyses to "Human", "Imagine" and "This is me" to correspond with the different categories (well  being, connection, and self-expression)  Daryl listed self-esteem as a need in a song writing activity to "Everybody"  Some effort noted toward treatment goal  Continue AT to encourage development and practice of human needs     Tx Plan Objective: 1 1,1 2,1 4, Therapist:  Ramon ALMANZAR; RADHA Eng

## 2022-01-12 ENCOUNTER — OFFICE VISIT (OUTPATIENT)
Dept: PSYCHIATRY | Facility: CLINIC | Age: 22
End: 2022-01-12
Payer: COMMERCIAL

## 2022-01-12 ENCOUNTER — OFFICE VISIT (OUTPATIENT)
Dept: PSYCHOLOGY | Facility: CLINIC | Age: 22
End: 2022-01-12
Payer: COMMERCIAL

## 2022-01-12 ENCOUNTER — TELEPHONE (OUTPATIENT)
Dept: PSYCHIATRY | Facility: CLINIC | Age: 22
End: 2022-01-12

## 2022-01-12 DIAGNOSIS — F90.0 ATTENTION DEFICIT HYPERACTIVITY DISORDER, INATTENTIVE TYPE: ICD-10-CM

## 2022-01-12 DIAGNOSIS — F31.32 BIPOLAR 1 DISORDER, DEPRESSED, MODERATE (HCC): Primary | ICD-10-CM

## 2022-01-12 PROCEDURE — 99213 OFFICE O/P EST LOW 20 MIN: CPT | Performed by: REGISTERED NURSE

## 2022-01-12 PROCEDURE — G0177 OPPS/PHP; TRAIN & EDUC SERV: HCPCS

## 2022-01-12 PROCEDURE — 1036F TOBACCO NON-USER: CPT | Performed by: REGISTERED NURSE

## 2022-01-12 PROCEDURE — G0410 GRP PSYCH PARTIAL HOSP 45-50: HCPCS

## 2022-01-12 PROCEDURE — G0176 OPPS/PHP;ACTIVITY THERAPY: HCPCS

## 2022-01-12 RX ORDER — FLUOXETINE 10 MG/1
10 CAPSULE ORAL DAILY
Qty: 30 CAPSULE | Refills: 0 | Status: SHIPPED | OUTPATIENT
Start: 2022-01-12 | End: 2022-06-07 | Stop reason: DRUGHIGH

## 2022-01-12 NOTE — PSYCH
Subjective:     Patient ID: Zaina Lord is a 25 y o  male  [facility] Clinical Progress Notes      Specialized Services Documentation  Therapist must complete separate progress note for each specific clinical activity in which the individual participated during the day  GROUP PSYCHOTHERAPY ([id number])   The group engaged in open discussions on the following topics: myths/beliefs commonly held about emotions, how our beliefs affect us, and how we can practice opposite action to counter maladaptive beliefs  Francisco Verduzco demonstrated progress towards goals and objectives through group participation and shared that he tends to get in negative thought cycles where he beliefs it is not useful to think any positive thoughts  Continue with psychotherapy  [address] [facility] Objectives: 1 1, 1 2, 1 4   Therapist: Patience Garibay MA, [place], Jules House MSW intern    Other ([phone]) CM consulted with Katie Parish after she met with Daryl for medication management  She stated she needed to call Mom due to Francisco Verduzco not knowing what medications he needed refills on to get him to his appointment with Dr Jonathan Marie Armida Vernon shared that she was informed by Mom that she does not believe he should be discharged at the end of the treatment week  It was relayed that he had been tearful and verbally suicidal when she picked him up from the end of his semester  CM will discuss with treatment team and speak with Daryl about conversations with Mom  Case Management Note    Patience Garibay MA, [place]    Current suicide risk : Low     A case management session is not scheduled today with Zaina Lord; additionally, they did not request a CM meeting  Next scheduled session is 01/13/22  Medications changes/added/denied? No    Treatment session number: 9    Individual Case Management Visit provided today?  No    [facility] follow up physician's orders: None at this time

## 2022-01-12 NOTE — PSYCH
Subjective:     Patient ID: Benja Rachel is a 25 y o  male  [facility] Clinical Progress Notes      Specialized Services Documentation  Therapist must complete separate progress note for each specific clinical activity in which the individual participated during the day  Allied Therapy   [id number]-  Benja Rachel  actively shared and participated in [facility] group focused on learning the key features of the  6 stages to change, identifying aspects to preparation for change, analyzing songs to think about where the narrator of the song is within the cycle of change, and sharing personal reflections within group discussion  Benja Rachel shared the importance of why the preparation phase is important in the cycle of change  Some effort noted toward treatment goal   Continue AT to encourage the development and practice of analyzing the change cycle to  alleviate symptoms and support wellness       Tx Plan Objective: 1 1, 1 2, 1 4     Therapist:  YOHAN Alford

## 2022-01-12 NOTE — PSYCH
Treatment Recommendations- Risks Benefits     Immediate Medical/Psychiatric/Psychotherapy Treatments and Any Precautions: medication management visit    Risks, Benefits And Possible Side Effects Of Medications:  Risks, benefits, and possible side effects of medications explained to patient and patient verbalizes understanding    Controlled Medication Discussion: n/a not on controlled medications     Psychotherapy Provided: Individual psychotherapy provided  Goals discussed in session:dischage planning    Assessment/Plan:     Continue Strattera 40 mg p o  Daily (per mother no refill needed)  Continue Wellbutrin  mg p o  Daily (per mother no refill needed)  Continue Prozac 30 mg p o  Daily (per mother no refill needed for 20 mg but will need 10 mg prior to seeing Dr Salvador Mujica (provider sent 10 mg #30 no refill to [facility])  Continue Tegretol 200 mg p o  B i d (per mother no refill required)  Continue quetiapine 50 mg p o  HS (per mother no refill required)  Planning discharge for 1/14/2022 and patient will be following with Dr Salvador Mujica in outpatient  Patient reports he is in process of looking for therapist  Utilize crisis as needed         Diagnoses and all orders for this visit:    Bipolar 1 disorder, depressed, moderate ([facility] [address] )    Attention deficit hyperactivity disorder, inattentive type          Subjective: Orestes was seen for medication management visit for Bipolar disorder and ADHD  He reports that his mood is "pretty stable"  He also stated that energy levels, focus and concentration are "good"  He denies suicidal or homicidal ideation  He denies any psychosis or symptoms of yi  He rated his depression and anxiety both 1/10 on scale with 10 being worse symptoms  He reports that he is taking medications as prescribed and denies any medication side effects  He reports that he utilizes a pill organizer that his mother sets up for him and has a routine to take medications   He reports appetite and sleep are both "good"  Marry Novak stated he will be following up with Dr Familia Thacker in outpatient and his main goal is to "find a therapist"  He also stated that he would like to find a job and "likes to help others"  He reports that he feels the program has been beneficial for him  Planned discharge for Friday 1/14/2022  Provider did check with mother who assists patient with medications to see what refills were needed and she reported that the only medication that will be needed prior to Daryl's outpatient appointment with Dr Familia Thacker would be Prozac 10 mg (refill #30 sent to [facility] pharmacy)  Mother reported that she had concerns about discharge from [facility] and had left message for Dr Queta Sharp office  Provider did inform mother that she would relay her concerns to the treatment team       Patient ID: Kade Luna is a 25 y o  male  HPI ROS Appetite Changes and Sleep: normal appetite and normal number of sleep hours    Review Of Systems:     Mood Normal   Behavior Normal    Thought Content Normal   General Normal    Personality Normal   Other Psych Symptoms Normal   Constitutional Normal   ENT Normal   Cardiovascular Normal    Respiratory Normal    Gastrointestinal Normal   Genitourinary Normal    Musculoskeletal Negative   Integumentary Normal    Neurological Normal    Endocrine Normal    Other Symptoms Normal              Laboratory Results: No results found for this or any previous visit      Substance Abuse History:  Social History     Substance and Sexual Activity   Drug Use Never       Family Psychiatric History:   Family History   Problem Relation Age of Onset    Depression Mother     Bipolar disorder Father     Suicide Attempts Father     Anxiety disorder Father     Alcohol abuse Maternal Grandfather     Alcohol abuse Paternal Grandfather     Multiple myeloma Maternal Grandmother     No Known Problems Paternal Grandmother        The following portions of the patient's history were reviewed and updated as appropriate: allergies, current medications, past family history, past medical history, past social history, past surgical history and problem list       Objective:     Physical Exam        Mental status:  Appearance calm and cooperative    Mood euthymic   Affect affect appropriate    Speech a normal rate   Thought Processes normal thought processes   Hallucinations no hallucinations present    Thought Content no delusions   Abnormal Thoughts no suicidal thoughts  and no homicidal thoughts    Orientation  oriented to person, oriented to place and oriented to time   Recent & Remote Memory short term memory intact and long term memory intact   Attention & Concentration Span concentration intact   Intellect Appears to be of Average Intelligence   Fund of Knowledge displays adequate knowledge of current events   Insight Insight intact   Judgement judgment was intact   Muscle Strength not tested   Language no difficulty naming common objects, no difficulty repeating a phrase  and no difficulty writing a sentence    Fund of Knowledge displays adequate knowledge of current events   Pain none   Pain Scale 0

## 2022-01-12 NOTE — TELEPHONE ENCOUNTER
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

## 2022-01-12 NOTE — PSYCH
Subjective:     Patient ID: Katy Esquivel is a 25 y o  male  [facility] Clinical Progress Notes      Specialized Services Documentation  Therapist must complete separate progress note for each specific clinical activity in which the individual participated during the day  Group Psychotherapy ([id number]) Jewel Sandoval was involved in a group that started with a video on a speaker from a richard talk presentation geared around how phones can steal our attention and get us pulled in longer than we attended  Transitioning into an open discussion portion where Jewel Sandoval participated sharing how phones/social media can affect our mood and overall well-being  Boundaries such tracking your time on certain apps was one way to monitor and assess ones own current issues regarding their phone use  Jewel Sandoval will continue with life skills and psychotherapy groups  Some progress made towards treatment  Tx Plan Objective: 1 1,1 2 Therapist:  CARLOS EDUARDO Arias     Education Therapy   [id number] Katy Esquivel actively shared in morning assessment and goal review  Presented as Receptive related to readiness to learn  Katy Esquivel did complete goal from last treatment day identifying gaining hope  did not present with any barriers to learning  [id number] Daryl Cantor engaged throughout the treatment day  Was engaged in learning related to Illness, Medication, Aftercare and Wellness Tools  Staff utilized Verbal, Written, A/V and Demonstration teaching methods  Katy Esquivel shared area of learning and set a goal for outside of program to practice a self-care activity tonight        Tx Plan Objective: 1 1,1 2 Therapist:  CARLOS EDUARDO Arias

## 2022-01-13 ENCOUNTER — OFFICE VISIT (OUTPATIENT)
Dept: PSYCHOLOGY | Facility: CLINIC | Age: 22
End: 2022-01-13
Payer: COMMERCIAL

## 2022-01-13 DIAGNOSIS — F31.32 BIPOLAR 1 DISORDER, DEPRESSED, MODERATE (HCC): Primary | ICD-10-CM

## 2022-01-13 DIAGNOSIS — F90.0 ATTENTION DEFICIT HYPERACTIVITY DISORDER, INATTENTIVE TYPE: ICD-10-CM

## 2022-01-13 PROCEDURE — G0176 OPPS/PHP;ACTIVITY THERAPY: HCPCS

## 2022-01-13 PROCEDURE — G0410 GRP PSYCH PARTIAL HOSP 45-50: HCPCS

## 2022-01-13 PROCEDURE — G0177 OPPS/PHP; TRAIN & EDUC SERV: HCPCS

## 2022-01-13 NOTE — PSYCH
Subjective:     Patient ID: Zaina Lord is a 25 y o  male  [facility] Clinical Progress Notes      Specialized Services Documentation  Therapist must complete separate progress note for each specific clinical activity in which the individual participated during the day  Case Management Note    Andrew Carter, [facility]    Current suicide risk : Low     ([id number]) Daryl reported being comfortable and prepared for discharge tomorrow  He denied suicidal ideations  He is prepared to look for a job  He does not want to return to school in the upcoming semester  Appointment with Dr Jonathan Marie scheduled for February 4th, 2022  Reviewed prescription refills from yesterday  Reviewed and signed treatment plan  Stated he has been thinking of two new positive affirmations daily  Has not yet called for outpatient therapist   Reviewed concerns presented by Christian Chun presented as confident to manage symptoms outside of PHP  Continue with discharge on next treatment day  Medications changes/added/denied? No    Treatment session number: 10    Individual Case Management Visit provided today?  Yes     [facility] follow up physician's orders: None at this time

## 2022-01-13 NOTE — PSYCH
Subjective:     Patient ID: Vivian Ricketts is a 25 y o  male  [facility] Clinical Progress Notes      Specialized Services Documentation  Therapist must complete separate progress note for each specific clinical activity in which the individual participated during the day  Allied Therapy   [id number]--  Vivian Ricketts participated in [facility] group focused on the use of music mindfulness and grounding strategies to regulate thoughts and emotions  Vivian Ricketts engaged in  music listening, practicing techniques, as well as group discussion  Group explored practice of various mindfulness strategies with active music listening to explore how to ground themselves when experiencing intense emotions  Group utilized sensory mindfulness techniques (rainbow, 5 countdown technique, draw your breath, etc ), rhythmic grounding, and listening to music to identify emotions and the body sensations that occur  Shantelsarah Renée stated his favorite mindfulness technique that he learned was the rainbow technique  Some effort noted toward treatment goal  Continue AT to encourage the development and practice of utilizing mindfulness techniques to alleviate symptoms and support wellness  Tx Plan Objective: 1 1,  1 2, & 1 4      Therapist:  SOPHIE GunterBC       Education Therapy   [id number] Vivian Ricketts actively shared in morning assessment and goal review  Presented as Receptive related to readiness to learn  Vivian Ricketts did complete goal from last treatment day identifying gaining hope  did not present with any barriers to learning  [id number] Daryl Cantor engaged throughout the treatment day  Was engaged in learning related to Illness, Medication, Aftercare and Wellness Tools  Staff utilized Verbal, Written, A/V and Demonstration teaching methods  Vivian Ricketts shared area of learning and set a goal for outside of program to look for local jobs and begin his resume        Tx Plan Objective: 1 1, 1 2, 1 4    Therapist:  SOPHIE ScottBC

## 2022-01-13 NOTE — PSYCH
[facility] Discharge Instructions:   Disposition: home  Address: [address]      Diagnosis:   1  Bipolar 1 disorder, depressed, moderate ([address] )     2  Attention deficit hyperactivity disorder, inattentive type         Allergies (Drug/Food): Allergies   Allergen Reactions    Penicillins Rash     Activity: No current employment  Diet:no recommendations  Smoking Cessation: The best thing you can do to improve your health is to stop using tobacco  Diagnostic/Laboratory Orders: No labs ordered while attending [facility]  Follow-up appointments/Referrals:   Medication Management: Appointment Date 02/04/2022   Dr Denton Postal     Outpatient Therapy:   None - made referrals on 01/07/22     Primary Care Physician:   David Eddy MD   [address] [facility] [address] (c) [phone] (f) [phone]      [facility]: [phone]  Intake/Referral/Evaluation (Non-Emergency) *NON INSURED FOR FUNDING: [facility]: [phone], [facility]: [phone], [facility]: [phone] and [place]: [phone]  Crisis Intervention (Emergency) [place] Service: [facility]: [phone], [place]: [phone], [facility] [address]: [phone], [facility] [phone], [facility]: [phone] and [facility]: [phone]  _________________________________  National Crisis Intervention Hotline: 2-565.147.7306  National Suicide Crisis Hotline: 2-881.240.5377  I, the undersigned, have received and understand the above instructions          Patient/Rep Signature: __________________________________       Date/Time: ______________         Physician Signature: ____________________________________      Date/Time: ______________               Signature: ________________________________       Date/Time: ______________

## 2022-01-13 NOTE — PSYCH
Subjective:     Patient ID: Tashi Mccoy is a 25 y o  male  [facility] Clinical Progress Notes      Specialized Services Documentation  Therapist must complete separate progress note for each specific clinical activity in which the individual participated during the day  Group Psychotherapy (9:30-10:30) Tashi Mccoy was present for this group was on patience  Participants began with a mindfulness activity entitled, "The Six Sense Doors Activity," in which they received a sense to focus on and describe to the group by another group member  Senses included, hearing, seeing, smelling, touching, last tasted and thinking  Participants discussed different types of patience, interpersonal patience, life challenges patience, and daily hassles patience and shared area in which they struggle  Participants were provided an example of how being impatient can lead to anger and negative mental health and physical health outcomes  Participants discussed ways of coping with impatience and shared examples of when patience has paid off  Participants were left with three journal prompts to explore patience in their lives, "Things I am impatient about in life    , Am I patient with myself, why or why not? What life would be like if I were more patient    Spikeenzo Barron was attentive during this group  At the start of group he indicated he was calm and therefore feeing a little rebellious but would not sabotage the group  This therapist thanked him for his insight into his emotions  He was quiet for most of the group but did participate in the mindfulness activity       Tx Goal Objective: 1 1,1 2  Therapist: LENORA Parsons

## 2022-01-13 NOTE — PSYCH
Subjective:     Patient ID: Mary Morris is a 25 y o  male  [facility] Clinical Progress Notes      Specialized Services Documentation  Therapist must complete separate progress note for each specific clinical activity in which the individual participated during the day  Group Psychotherapy ([id number]) Stella Becker was verbally engaged and interacted during todays group on the DBT skills A C C E P T S  , STOP skill, Pros & Cons, and Tip Skill  This DBT acronym A C C E P T S  outlines seven different techniques for distracting yourself when distressing emotions start to overwhelm our thoughts  Each member participated in reviewing the seven different key techniques  Then TIP skill was reviewed, going over four different ways we can also manage extreme emotions  The group also discussed the STOP skill and Pros & Cons as different strategies to use when under distress  Stella Becker will continue with life skills and psychotherapy groups  Good progress made towards treatment   Tx Plan Objective: 1 1,1 2 Therapist:  CARLOS EDUARDO Vital

## 2022-01-13 NOTE — PSYCH
Assessment/Plan:       Diagnoses and all orders for this visit:     Bipolar 1 disorder, depressed, moderate (HCC)     Attention deficit hyperactivity disorder, inattentive type       Subjective:      Patient ID: Ever Awan is a 25 y o  male  [facility] Treatment Plan   AREAS OF NEED: Bipolar Disorder and ADHD as evidenced by isolation, passive death wishes, helplessness, low energy and decreased motivation due to family stressors and declining performance at school  Date Initiated: 12/29/21     Strengths: "helping others and motivational"                 LONG TERM GOAL:   Date Initiated: 12/29/21  1 0 I will identify 3 ways that I am more pleasant and independent while attending [facility]  Target Date: 01/26/2022  Completion Date: Discharge - 01/14/22         SHORT TERM OBJECTIVES:      Date Initiated: 12/29/21  1 1 I will engage in 2 coping skills daily in order to improve my distress tolerance  Revision Date:   Target Date: 01/11/2022  Completion Date: Discharge - 01/14/22      Date Initiated: 12/29/21  1 2 I will verbally participate in group to learn effective communication of my emotions  Revision Date:   Target Date:  01/11/2022  Completion Date: Discharge - 01/14/22      Date Initiated: 12/29/21  1 3 I will take medications as prescribed and share questions and concerns if arise  Revision Date:  Target Date:  01/11/2022  Completion Date: Discharge - 01/14/22      Date Initiated: 12/29/21  1 4 I will identify 3 ways my supports can assist in my recovery and agree to staff/support contact as indicated      Revision Date:  Target Date:  01/11/2022  Completion Date: Discharge - 01/14/22             7 DAY REVISION:     Date Initiated:  Revision Date:   Target Date:   Completion Date:        PSYCHIATRY:  Date Initiated:  12/29/21  Medication Management and Education       Revision Date:        The person(s) responsible for carrying out the plan is Yanick Laura MD     NURSING/SYMPTOM EDUCATION:  Date Initiated: 12/29/21       1 1, 1 2  1 3, 1 4 Provide wellness/symptoms and skill education groups three to five days weekly to educate Jason Yee on signs and symptoms of diagnoses, skills to manage stressors, and medication questions that will be addressed by the treatment team         Revision date: The person(s) responsible for carrying out the plan is Cindy Arroyo MA and CARLOS EDUARDO Sherwood     PSYCHOLOGY:   Date Initiated: 12/29/21       1 1, 1 2, 1 4 Provide psychotherapy group 5 times per week to allow opportunity for Jason Yee  to explore stressors and ways of coping  Revision Date:   The person(s) responsible for carrying out the plan is Andrew Juarez [facility]     ALLIED THERAPY:   Date Initiated: 12/29/21  1 1,1 2 Engage Jason Yee in AT group 5 times daily to encourage development and use of wellness tools to decrease symptoms and promote recovery through meaningful activity  Revision Date:       The person(s) responsible for carrying out the plan is YOHAN Ryan and YOHAN Turk     CASE MANAGEMENT:   Date Initiated: 12/29/21      1 0 This  will meet with Jason Yee  3-4 times weekly to assess treatment progress, discharge planning, connection to community supports and UR as indicated  Revision Date:   The person(s) responsible for carrying out the plan is Josie Dong MA, [facility]     TREATMENT REVIEW/COMMENTS:      DISCHARGE CRITERIA: Identify 3 signs of progress and complete relapse prevention plan  DISCHARGE PLAN: Connect with identified outpatient providers     Estimated Length of Stay: 10 treatment days       Diagnosis and Treatment Plan explained to Guero Wrightr relates understanding diagnosis and is agreeable to Treatment Plan             CLIENT COMMENTS / Please share your thoughts, feelings, need and/or experiences regarding your treatment plan: _____________________________________________________________________________________________________________________________________________________________________________________________________________________________________________________________________________________________________________________ Date/Time: ______________      Patient Signature: _________________________________      Date/Time: ______________       Signature: _________________________________          Date/Time: ______________

## 2022-01-14 ENCOUNTER — OFFICE VISIT (OUTPATIENT)
Dept: PSYCHOLOGY | Facility: CLINIC | Age: 22
End: 2022-01-14
Payer: COMMERCIAL

## 2022-01-14 DIAGNOSIS — F31.32 BIPOLAR 1 DISORDER, DEPRESSED, MODERATE (HCC): Primary | ICD-10-CM

## 2022-01-14 DIAGNOSIS — F90.0 ATTENTION DEFICIT HYPERACTIVITY DISORDER, INATTENTIVE TYPE: ICD-10-CM

## 2022-01-14 PROCEDURE — G0410 GRP PSYCH PARTIAL HOSP 45-50: HCPCS

## 2022-01-14 PROCEDURE — G0177 OPPS/PHP; TRAIN & EDUC SERV: HCPCS

## 2022-01-14 PROCEDURE — G0176 OPPS/PHP;ACTIVITY THERAPY: HCPCS

## 2022-01-14 NOTE — PSYCH
[facility] Clinical Progress Notes      Specialized Services Documentation  Therapist must complete separate progress note for each specific clinical activity in which the individual participated during the day         [facility] follow up physician's orders:   DATE 1/14/2022  TIME 10:36  Gabrielle Connor MD

## 2022-01-14 NOTE — PSYCH
Subjective:     Patient ID: Abelardo Russ is a 25 y o  male  [facility] Clinical Progress Notes      Specialized Services Documentation  Therapist must complete separate progress note for each specific clinical activity in which the individual participated during the day  Allied Therapy   [id number]- The group engaged in the wellness assessment, which evaluates progress on several different areas of wellness/wellbeing: physical, emotional, cognitive, vocational, social and spiritual  Clients rated their progress and discussed areas that need work  By completing and discussing areas of progress and challenges, members are connected and reminded that, in their mental health struggle, they are not alone  Topics of discussion revolved around positive experiences within each area of wellness as well as the challenging aspects to wellness within their past week  When given the opportunity to share insight regarding the strengths and challenges he faced this past week within the cognitive domain,  Daryl only focused on the strengths and stated there were no challenges  Therapist inferred Sherron Nazario was masking and resistant to being vulnerable with the group  Abelardo Russ continues to make progress towards goals through participation in group activity and personal disclosures  Continue AT to encourage the development and practice of reflecting on their mental health journey  to alleviate symptoms and support wellness    TX Plan Objectives: 1 1, 1 2, 1 4  Therapist: YOHAN Byrd
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
## 2022-01-14 NOTE — PSYCH
Subjective:     Patient ID: Patria Ramirez is a 25 y o  male  [facility] Clinical Progress Notes      Specialized Services Documentation  Therapist must complete separate progress note for each specific clinical activity in which the individual participated during the day  GROUP PSYCHOTHERAPY ([id number]) Daryl engaged in an open-discussion process group   The group first started with working through a handout on what is a Dialectic before finishing a handout on Goals of Skills training   Then the group talked about the good and the bad from their extended holiday weekend   The group also talked about the skills they have been learning and how practicing them at home has been going   Vu Kazakh will continue with life skills upon discharge   Good progress made towards treatment  [facility] Objectives: 1 1, 1 2, 1 4  Therapist: CARLOS EDUARDO Forbes      Education Therapy   [id number] Patria Ramirez actively shared in morning assessment and goal review  Presented as Receptive related to readiness to learn  Patria Ramirez did complete goal from last treatment day identifying gaining education, responsibility, and hope  did not present with any barriers to learning  [id number] Daryl Cantor engaged throughout the treatment day  Was engaged in learning related to Illness, Medication, Aftercare and Wellness Tools  Staff utilized Verbal, Written, A/V and Demonstration teaching methods  Patria Ramirez shared area of learning and set a goal for outside of program to continue utilizing the skills that he learned in program upon discharge        Tx Plan Objective: 1 1,1 2 Therapist:  CARLOS EDUARDO Forbes
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
## 2022-01-21 DIAGNOSIS — F31.32 BIPOLAR 1 DISORDER, DEPRESSED, MODERATE (HCC): ICD-10-CM

## 2022-01-21 DIAGNOSIS — F90.0 ATTENTION DEFICIT HYPERACTIVITY DISORDER, INATTENTIVE TYPE: ICD-10-CM

## 2022-01-21 RX ORDER — ATOMOXETINE 40 MG/1
40 CAPSULE ORAL DAILY
Qty: 90 CAPSULE | Refills: 0 | Status: SHIPPED | OUTPATIENT
Start: 2022-01-21 | End: 2022-01-25 | Stop reason: SDUPTHER

## 2022-01-25 DIAGNOSIS — F31.32 BIPOLAR 1 DISORDER, DEPRESSED, MODERATE (HCC): ICD-10-CM

## 2022-01-25 DIAGNOSIS — F90.0 ATTENTION DEFICIT HYPERACTIVITY DISORDER, INATTENTIVE TYPE: ICD-10-CM

## 2022-01-25 RX ORDER — ATOMOXETINE 40 MG/1
40 CAPSULE ORAL DAILY
Qty: 90 CAPSULE | Refills: 0 | Status: SHIPPED | OUTPATIENT
Start: 2022-01-25 | End: 2022-02-04 | Stop reason: DRUGHIGH

## 2022-02-04 ENCOUNTER — OFFICE VISIT (OUTPATIENT)
Dept: PSYCHIATRY | Facility: CLINIC | Age: 22
End: 2022-02-04
Payer: COMMERCIAL

## 2022-02-04 DIAGNOSIS — F90.0 ATTENTION DEFICIT HYPERACTIVITY DISORDER, INATTENTIVE TYPE: ICD-10-CM

## 2022-02-04 DIAGNOSIS — F31.32 BIPOLAR 1 DISORDER, DEPRESSED, MODERATE (HCC): ICD-10-CM

## 2022-02-04 PROCEDURE — 1036F TOBACCO NON-USER: CPT | Performed by: PSYCHIATRY & NEUROLOGY

## 2022-02-04 PROCEDURE — 90833 PSYTX W PT W E/M 30 MIN: CPT | Performed by: PSYCHIATRY & NEUROLOGY

## 2022-02-04 PROCEDURE — 99214 OFFICE O/P EST MOD 30 MIN: CPT | Performed by: PSYCHIATRY & NEUROLOGY

## 2022-02-04 RX ORDER — ATOMOXETINE 60 MG/1
60 CAPSULE ORAL DAILY
Qty: 90 CAPSULE | Refills: 0 | Status: SHIPPED | OUTPATIENT
Start: 2022-02-04 | End: 2022-04-12

## 2022-02-04 RX ORDER — FLUOXETINE HYDROCHLORIDE 20 MG/1
20 CAPSULE ORAL DAILY
Qty: 90 CAPSULE | Refills: 0 | Status: SHIPPED | OUTPATIENT
Start: 2022-02-04 | End: 2022-04-12

## 2022-02-04 RX ORDER — CARBAMAZEPINE 200 MG/1
200 TABLET, EXTENDED RELEASE ORAL 2 TIMES DAILY
Qty: 180 TABLET | Refills: 0 | Status: SHIPPED | OUTPATIENT
Start: 2022-02-04 | End: 2022-04-25

## 2022-02-04 RX ORDER — BUPROPION HYDROCHLORIDE 300 MG/1
300 TABLET ORAL DAILY
Qty: 90 TABLET | Refills: 0 | Status: SHIPPED | OUTPATIENT
Start: 2022-02-04 | End: 2022-04-12
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
## 2022-04-05 ENCOUNTER — OFFICE VISIT (OUTPATIENT)
Dept: PSYCHIATRY | Facility: CLINIC | Age: 22
End: 2022-04-05
Payer: COMMERCIAL

## 2022-04-05 DIAGNOSIS — F90.0 ATTENTION DEFICIT HYPERACTIVITY DISORDER, INATTENTIVE TYPE: Primary | ICD-10-CM

## 2022-04-05 DIAGNOSIS — F31.32 BIPOLAR 1 DISORDER, DEPRESSED, MODERATE (HCC): ICD-10-CM

## 2022-04-05 PROCEDURE — 99214 OFFICE O/P EST MOD 30 MIN: CPT | Performed by: PSYCHIATRY & NEUROLOGY

## 2022-04-05 PROCEDURE — 1036F TOBACCO NON-USER: CPT | Performed by: PSYCHIATRY & NEUROLOGY

## 2022-04-05 PROCEDURE — 90833 PSYTX W PT W E/M 30 MIN: CPT | Performed by: PSYCHIATRY & NEUROLOGY

## 2022-05-19 ENCOUNTER — OFFICE VISIT (OUTPATIENT)
Dept: URGENT CARE | Facility: CLINIC | Age: 22
End: 2022-05-19
Payer: COMMERCIAL

## 2022-05-19 VITALS
RESPIRATION RATE: 20 BRPM | HEART RATE: 94 BPM | SYSTOLIC BLOOD PRESSURE: 136 MMHG | DIASTOLIC BLOOD PRESSURE: 84 MMHG | OXYGEN SATURATION: 97 % | TEMPERATURE: 96.7 F

## 2022-05-19 DIAGNOSIS — J06.9 VIRAL URI WITH COUGH: Primary | ICD-10-CM

## 2022-05-19 PROCEDURE — 99213 OFFICE O/P EST LOW 20 MIN: CPT | Performed by: NURSE PRACTITIONER

## 2022-05-19 RX ORDER — ALBUTEROL SULFATE 90 UG/1
2 AEROSOL, METERED RESPIRATORY (INHALATION) EVERY 6 HOURS PRN
Qty: 8.5 G | Refills: 0 | Status: SHIPPED | OUTPATIENT
Start: 2022-05-19 | End: 2022-07-06

## 2022-05-19 RX ORDER — PREDNISONE 20 MG/1
20 TABLET ORAL 2 TIMES DAILY WITH MEALS
Qty: 10 TABLET | Refills: 0 | Status: SHIPPED | OUTPATIENT
Start: 2022-05-19 | End: 2022-05-24

## 2022-05-19 RX ORDER — BENZONATATE 200 MG/1
200 CAPSULE ORAL 3 TIMES DAILY PRN
Qty: 20 CAPSULE | Refills: 0 | Status: SHIPPED | OUTPATIENT
Start: 2022-05-19 | End: 2022-07-06

## 2022-05-19 NOTE — PATIENT INSTRUCTIONS
Sinusitis   AMBULATORY CARE:   Sinusitis  is inflammation or infection of your sinuses  Sinusitis is most often caused by a virus  Acute sinusitis may last up to 12 weeks  Chronic sinusitis lasts longer than 12 weeks  Recurrent sinusitis means you have 4 or more infections in 1 year  Common signs and symptoms:   Fever    Pain, pressure, redness, or swelling around the forehead, cheeks, or eyes    Thick yellow or green discharge from your nose    Tenderness when you touch your face over your sinuses    Dry cough that happens mostly at night or when you lie down    Headache and face pain that is worse when you lean forward    Tooth pain, or pain when you chew    Seek care immediately if:   You have trouble breathing or wheezing that is getting worse  You have a stiff neck, a fever, or a bad headache  You cannot open your eye  Your eyeball bulges out or you cannot move your eye  You are more sleepy than normal, or you notice changes in your ability to think, move, or talk  You have swelling of your forehead or scalp  Call your doctor if:   You have vision changes, such as double vision  Your eye and eyelid are red, swollen, and painful  Your symptoms do not improve or go away after 10 days  You have nausea and are vomiting  Your nose is bleeding  You have questions or concerns about your condition or care  Medicines: Your symptoms may go away on their own  Your healthcare provider may recommend watchful waiting for up to 10 days before starting antibiotics  You may need any of the following:  Acetaminophen  decreases pain and fever  It is available without a doctor's order  Ask how much to take and how often to take it  Follow directions  Read the labels of all other medicines you are using to see if they also contain acetaminophen, or ask your doctor or pharmacist  Acetaminophen can cause liver damage if not taken correctly   Do not use more than 4 grams (4,000 milligrams) total of acetaminophen in one day  NSAIDs , such as ibuprofen, help decrease swelling, pain, and fever  This medicine is available with or without a doctor's order  NSAIDs can cause stomach bleeding or kidney problems in certain people  If you take blood thinner medicine, always ask your healthcare provider if NSAIDs are safe for you  Always read the medicine label and follow directions  Nasal steroid sprays  may help decrease inflammation in your nose and sinuses  Decongestants  help reduce swelling and drain mucus in the nose and sinuses  They may help you breathe easier  Antihistamines  help dry mucus in the nose and relieve sneezing  Antibiotics  help treat or prevent a bacterial infection  Self-care:   Rinse your sinuses as directed  Use a sinus rinse device to rinse your nasal passages with a saline (salt water) solution or distilled water  Do not use tap water  This will help thin the mucus in your nose and rinse away pollen and dirt  It will also help reduce swelling so you can breathe normally  Use a humidifier  to increase air moisture in your home  This may make it easier for you to breathe and help decrease your cough  Sleep with your head elevated  Place an extra pillow under your head before you go to sleep to help your sinuses drain  Drink liquids as directed  Ask your healthcare provider how much liquid to drink each day and which liquids are best for you  Liquids will thin the mucus in your nose and help it drain  Avoid drinks that contain alcohol or caffeine  Do not smoke, and avoid secondhand smoke  Nicotine and other chemicals in cigarettes and cigars can make your symptoms worse  Ask your healthcare provider for information if you currently smoke and need help to quit  E-cigarettes or smokeless tobacco still contain nicotine  Talk to your healthcare provider before you use these products      Prevent the spread of germs:   Wash your hands often with soap and water  Wash your hands after you use the bathroom, change a child's diaper, or sneeze  Wash your hands before you prepare or eat food  Stay away from people who are sick  Some germs spread easily and quickly through contact  Follow up with your doctor as directed: You may be referred to an ear, nose, and throat specialist  Write down your questions so you remember to ask them during your visits  © Copyright [employer] 2022 Information is for End User's use only and may not be sold, redistributed or otherwise used for commercial purposes  All illustrations and images included in CareNotes® are the copyrighted property of A D A M , Inc  or [facility] Umberto Espinal   The above information is an  only  It is not intended as medical advice for individual conditions or treatments  Talk to your doctor, nurse or pharmacist before following any medical regimen to see if it is safe and effective for you

## 2022-05-19 NOTE — PROGRESS NOTES
[facility]        NAME: Dean Wright is a 25 y o  male  : 2000    MRN: [id number]  DATE: May 19, 2022  TIME: 3:00 PM    Assessment and Plan   Viral URI with cough [J06 9]  1  Viral URI with cough  benzonatate (TESSALON) 200 MG capsule    predniSONE 20 mg tablet    albuterol (ProAir HFA) 90 mcg/act inhaler     Will start tessalon and albuterol for cough   Prednisone for sinusitis if needed   Reviewed s/e of medication with his personal medical history   Discussed otc medication to also assist with symptom relief  Recommend repeat covid test in 2-3 days if still symptomatic  Pt in agreement with plan    Patient Instructions     Follow up with PCP in 3-5 days  Proceed to  ER if symptoms worsen  Chief Complaint     Chief Complaint   Patient presents with    Cough     Patient states that he started at 330 am with coughing and  has a HA         History of Present Illness   Dean Wright presents to the clinic c/o    Cough (Patient states that he started at 330 am with coughing and  has a HA)  Just started at [employer] about 1 5 months ago   Not sure if cough was related to the dust at work or if related to a URI   Has some fatigue and congestion also   Did an at home covid test and it was negative  Review of Systems   Review of Systems   All other systems reviewed and are negative          Current Medications     Long-Term Medications   Medication Sig Dispense Refill    atoMOXetine (STRATTERA) 60 mg capsule TAKE 1 CAPSULE BY MOUTH  DAILY 90 capsule 0    buPROPion (WELLBUTRIN XL) 300 mg 24 hr tablet TAKE 1 TABLET BY MOUTH  DAILY 90 tablet 0    carBAMazepine (TEGretol XR) 200 mg 12 hr tablet TAKE 1 TABLET BY MOUTH  TWICE DAILY 180 tablet 0    FLUoxetine (PROzac) 10 mg capsule Take 1 capsule (10 mg total) by mouth daily Patient reports total daily dose of 30mg; per last note Dr Jose Dobbins recommend increase to 30mg po daily 30 capsule 0    FLUoxetine (PROzac) 20 mg capsule TAKE 1 CAPSULE BY MOUTH  DAILY 90 capsule 0    [DISCONTINUED] desvenlafaxine succinate (PRISTIQ) 50 mg 24 hr tablet Take 1 tablet (50 mg total) by mouth daily 30 tablet 2       Current Allergies     Allergies as of 05/19/2022 - Reviewed 05/19/2022   Allergen Reaction Noted    Penicillins Rash 07/10/2018            The following portions of the patient's history were reviewed and updated as appropriate: allergies, current medications, past family history, past medical history, past social history, past surgical history and problem list     Objective   /84   Pulse 94   Temp (!) 96 7 °F (35 9 °C) (Tympanic)   Resp 20   SpO2 97%        Physical Exam     Physical Exam  Vitals and nursing note reviewed  Constitutional:       Appearance: Normal appearance  He is well-developed  HENT:      Head: Normocephalic and atraumatic  Right Ear: Hearing, tympanic membrane, ear canal and external ear normal       Left Ear: Hearing, tympanic membrane, ear canal and external ear normal       Nose: Mucosal edema and congestion present  Right Sinus: No maxillary sinus tenderness or frontal sinus tenderness  Left Sinus: Maxillary sinus tenderness and frontal sinus tenderness present  Mouth/Throat:      Mouth: Mucous membranes are moist    Eyes:      General: Lids are normal       Conjunctiva/sclera: Conjunctivae normal       Pupils: Pupils are equal, round, and reactive to light  Cardiovascular:      Rate and Rhythm: Normal rate and regular rhythm  Heart sounds: Normal heart sounds, S1 normal and S2 normal    Pulmonary:      Effort: Pulmonary effort is normal       Breath sounds: Normal breath sounds  Musculoskeletal:      Cervical back: Full passive range of motion without pain, normal range of motion and neck supple  Skin:     General: Skin is warm and dry  Neurological:      Mental Status: He is alert     Psychiatric:         Speech: Speech normal          Behavior: Behavior normal  Behavior is cooperative  Thought Content:  Thought content normal          Judgment: Judgment normal

## 2022-05-31 ENCOUNTER — OFFICE VISIT (OUTPATIENT)
Dept: PODIATRY | Facility: CLINIC | Age: 22
End: 2022-05-31
Payer: COMMERCIAL

## 2022-05-31 VITALS
HEIGHT: 70 IN | SYSTOLIC BLOOD PRESSURE: 121 MMHG | DIASTOLIC BLOOD PRESSURE: 80 MMHG | HEART RATE: 116 BPM | WEIGHT: 272 LBS | BODY MASS INDEX: 38.94 KG/M2

## 2022-05-31 DIAGNOSIS — L03.032 PARONYCHIA OF GREAT TOE OF LEFT FOOT: Primary | ICD-10-CM

## 2022-05-31 PROCEDURE — 3008F BODY MASS INDEX DOCD: CPT | Performed by: PSYCHIATRY & NEUROLOGY

## 2022-05-31 PROCEDURE — 11730 AVULSION NAIL PLATE SIMPLE 1: CPT | Performed by: PODIATRIST

## 2022-05-31 RX ORDER — LIDOCAINE HYDROCHLORIDE 10 MG/ML
3 INJECTION, SOLUTION INFILTRATION; PERINEURAL ONCE
Status: COMPLETED | OUTPATIENT
Start: 2022-05-31 | End: 2022-05-31

## 2022-05-31 RX ADMIN — LIDOCAINE HYDROCHLORIDE 3 ML: 10 INJECTION, SOLUTION INFILTRATION; PERINEURAL at 16:15

## 2022-05-31 NOTE — PATIENT INSTRUCTIONS
POST-OPERATIVE INSTRUCTIONS FOLLOWING NAIL REMOVAL    TODAY  Leave dressing intact  Rest  Take ibuprofen or tylenol as needed  TOMORROW AM  Remove the dressing, if it sticks, get it wet with water  Soak the toe in warm water for 10 minutes, you may add epsom salts if you want  Dry toe, apply small amount of neosporin or bacitracin to the nail bed with a bandaid  TOMORROW PM  Remove the dressing, if it sticks, get it wet with water  Soak the toe in warm water for 10 minutes, you may add epsom salts if you want  Dry toe, apply small amount of neosporin or bacitracin to the nail bed with a bandaid  NEXT 5 DAYS  Repeat steps 2 and 3  After 5 days you can stop applying neosporin  Just clean the toe daily with soap and water and cover with a bandaid  Once all drainage stops, you can stop placing bandaid on the toe      You may notice some redness on the side of the nail bed  This is ok  Some minor bleeding or clear-yellowish drainage is normal  If your toe begins to drain thick, white creamy drainage (pus) call immediately  If you notice redness streaking onto the foot and ankle, call our office immediately  Infections are rare after these procedures but we may have to prescribe an antibiotic

## 2022-05-31 NOTE — PROGRESS NOTES
Assessment/Plan:      Diagnoses and all orders for this visit:    Paronychia of great toe of left foot  -     lidocaine (XYLOCAINE) 1 % injection 3 mL      Patient declined phenol matrixectomy, see temporary nail removal procedure below to left medial hallux  Postop instructions given  Nail removal    Date/Time: 5/31/2022 4:16 PM  Performed by: Yanira Morgan DPM  Authorized by: Yanira Morgan DPM     Patient location:  ClinicUniversal Protocol:  Consent: Verbal consent obtained  Risks and benefits: risks, benefits and alternatives were discussed  Consent given by: patient  Timeout called at: 5/31/2022 4:16 PM   Patient understanding: patient states understanding of the procedure being performed  Patient identity confirmed: verbally with patient      Location:     Foot:  L big toe  Pre-procedure details:     Skin preparation:  Betadine  Anesthesia (see MAR for exact dosages): Anesthesia method:  Local infiltration    Local anesthetic:  Lidocaine 1% w/o epi  Nail Removal:     Nail removed:  Partial    Nail side:  Medial  Ingrown nail:     Nail matrix removed or ablated:  None  Post-procedure details:     Dressing:  4x4 sterile gauze, antibiotic ointment and gauze roll    Patient tolerance of procedure: Tolerated well, no immediate complications          Subjective:     Patient ID: Dina Lugo is a 25 y o  male  Patient previously had paronychia of his right great toenail and the lateral left toenail  Now the medial left great toenail is ingrown and tender  Review of Systems      Objective:     Physical Exam  Vitals reviewed  Cardiovascular:      Pulses: Normal pulses  Musculoskeletal:         General: No deformity  Skin:     Findings: Erythema (paronychia left medial hallux with pain  ) present

## 2022-06-07 ENCOUNTER — TELEMEDICINE (OUTPATIENT)
Dept: PSYCHIATRY | Facility: CLINIC | Age: 22
End: 2022-06-07
Payer: COMMERCIAL

## 2022-06-07 ENCOUNTER — TELEPHONE (OUTPATIENT)
Dept: PSYCHIATRY | Facility: CLINIC | Age: 22
End: 2022-06-07

## 2022-06-07 DIAGNOSIS — F90.0 ATTENTION DEFICIT HYPERACTIVITY DISORDER, INATTENTIVE TYPE: ICD-10-CM

## 2022-06-07 DIAGNOSIS — F31.32 BIPOLAR 1 DISORDER, DEPRESSED, MODERATE (HCC): Primary | ICD-10-CM

## 2022-06-07 PROCEDURE — 99214 OFFICE O/P EST MOD 30 MIN: CPT | Performed by: PSYCHIATRY & NEUROLOGY

## 2022-06-07 PROCEDURE — 1036F TOBACCO NON-USER: CPT | Performed by: PSYCHIATRY & NEUROLOGY

## 2022-06-07 PROCEDURE — 90833 PSYTX W PT W E/M 30 MIN: CPT | Performed by: PSYCHIATRY & NEUROLOGY

## 2022-06-07 RX ORDER — ATOMOXETINE 60 MG/1
60 CAPSULE ORAL DAILY
Qty: 90 CAPSULE | Refills: 0 | Status: SHIPPED | OUTPATIENT
Start: 2022-06-07

## 2022-06-07 RX ORDER — CARBAMAZEPINE 200 MG/1
200 TABLET, EXTENDED RELEASE ORAL 2 TIMES DAILY
Qty: 180 TABLET | Refills: 0 | Status: SHIPPED | OUTPATIENT
Start: 2022-06-07

## 2022-06-07 RX ORDER — BUPROPION HYDROCHLORIDE 300 MG/1
300 TABLET ORAL DAILY
Qty: 90 TABLET | Refills: 0 | Status: SHIPPED | OUTPATIENT
Start: 2022-06-07

## 2022-06-07 RX ORDER — FLUOXETINE HYDROCHLORIDE 40 MG/1
40 CAPSULE ORAL DAILY
Qty: 90 CAPSULE | Refills: 0 | Status: SHIPPED | OUTPATIENT
Start: 2022-06-07
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
## 2022-06-07 NOTE — TELEPHONE ENCOUNTER
Lincoln Woods MD had follow up instructions of 2 months on the virtual visit check out  Writer called and left a message to contact our office to schedule follow-up at earliest convenience    Thank you

## 2022-07-06 ENCOUNTER — OFFICE VISIT (OUTPATIENT)
Dept: FAMILY MEDICINE CLINIC | Facility: CLINIC | Age: 22
End: 2022-07-06
Payer: COMMERCIAL

## 2022-07-06 VITALS
HEIGHT: 73 IN | WEIGHT: 269 LBS | HEART RATE: 104 BPM | DIASTOLIC BLOOD PRESSURE: 74 MMHG | BODY MASS INDEX: 35.65 KG/M2 | SYSTOLIC BLOOD PRESSURE: 108 MMHG

## 2022-07-06 DIAGNOSIS — R63.5 WEIGHT GAIN: ICD-10-CM

## 2022-07-06 DIAGNOSIS — F31.32 BIPOLAR 1 DISORDER, DEPRESSED, MODERATE (HCC): ICD-10-CM

## 2022-07-06 DIAGNOSIS — Z13.220 ENCOUNTER FOR SCREENING FOR LIPID DISORDER: ICD-10-CM

## 2022-07-06 DIAGNOSIS — Z11.59 NEED FOR HEPATITIS C SCREENING TEST: ICD-10-CM

## 2022-07-06 DIAGNOSIS — F90.0 ATTENTION DEFICIT HYPERACTIVITY DISORDER, INATTENTIVE TYPE: ICD-10-CM

## 2022-07-06 DIAGNOSIS — Z00.00 HEALTH CARE MAINTENANCE: Primary | ICD-10-CM

## 2022-07-06 DIAGNOSIS — Z11.4 SCREENING FOR HIV (HUMAN IMMUNODEFICIENCY VIRUS): ICD-10-CM

## 2022-07-06 PROBLEM — E66.9 OBESITY (BMI 30-39.9): Status: ACTIVE | Noted: 2022-07-06

## 2022-07-06 PROCEDURE — 99395 PREV VISIT EST AGE 18-39: CPT | Performed by: FAMILY MEDICINE

## 2022-07-06 NOTE — PROGRESS NOTES
Assessment and Plan:  1  Healthcare maintenance   Exam completed  Immunizations reviewed   Patient will check if he had his 2nd meningitis B vaccine if not he will need this  Blood work ordered to include lipid, HIV, hepatitis C screen  2  ADD/bipolar, stable follows with Psychiatry   3  BMI 35 78/waking, seemed to be secondary to psychiatric medication  Patient has active trying to lose weight, TSH and cortisol was ordered  4  Return in 1 year sooner if needed    Problem List Items Addressed This Visit        Other    Bipolar 1 disorder, depressed, moderate ([address] )     Stable follows with specialist           Relevant Orders    CBC    Comprehensive metabolic panel    Lipid Panel with Direct LDL reflex    TSH, 3rd generation with Free T4 reflex    UA (URINE) with reflex to Scope    Cortisol Level, AM Specimen    Attention deficit hyperactivity disorder, inattentive type     Stable follows with specialist           Relevant Orders    CBC    Comprehensive metabolic panel    Lipid Panel with Direct LDL reflex    TSH, 3rd generation with Free T4 reflex    UA (URINE) with reflex to Scope    Cortisol Level, AM Specimen    Health care maintenance - Primary     Exam completed, immunization reviewed  Patient will check if he had his meningitis B booster      Blood work is ordered to include hepatitis-C, HIV, and lipid panel           Relevant Orders    CBC    Comprehensive metabolic panel    Lipid Panel with Direct LDL reflex    TSH, 3rd generation with Free T4 reflex    UA (URINE) with reflex to Scope    Cortisol Level, AM Specimen      Other Visit Diagnoses     Encounter for screening for lipid disorder        Relevant Orders    CBC    Comprehensive metabolic panel    Lipid Panel with Direct LDL reflex    TSH, 3rd generation with Free T4 reflex    UA (URINE) with reflex to Scope    Cortisol Level, AM Specimen    Need for hepatitis C screening test        Relevant Orders    Hepatitis C Antibody ([facility])    CBC Comprehensive metabolic panel    Lipid Panel with Direct LDL reflex    TSH, 3rd generation with Free T4 reflex    UA (URINE) with reflex to Scope    Cortisol Level, AM Specimen    Screening for HIV (human immunodeficiency virus)        Relevant Orders    HIV 1/2 Antigen/Antibody (4th Generation) w Reflex [facility]    CBC    Comprehensive metabolic panel    Lipid Panel with Direct LDL reflex    TSH, 3rd generation with Free T4 reflex    UA (URINE) with reflex to Scope    Cortisol Level, AM Specimen    Weight gain        Relevant Orders    CBC    Comprehensive metabolic panel    Lipid Panel with Direct LDL reflex    TSH, 3rd generation with Free T4 reflex    UA (URINE) with reflex to Scope    Cortisol Level, AM Specimen                 Diagnoses and all orders for this visit:    Health care maintenance  -     CBC; Future  -     Comprehensive metabolic panel; Future  -     Lipid Panel with Direct LDL reflex; Future  -     TSH, 3rd generation with Free T4 reflex; Future  -     UA (URINE) with reflex to Scope; Future  -     Cortisol Level, AM Specimen; Future    Attention deficit hyperactivity disorder, inattentive type  -     CBC; Future  -     Comprehensive metabolic panel; Future  -     Lipid Panel with Direct LDL reflex; Future  -     TSH, 3rd generation with Free T4 reflex; Future  -     UA (URINE) with reflex to Scope; Future  -     Cortisol Level, AM Specimen; Future    Bipolar 1 disorder, depressed, moderate (HCC)  -     CBC; Future  -     Comprehensive metabolic panel; Future  -     Lipid Panel with Direct LDL reflex; Future  -     TSH, 3rd generation with Free T4 reflex; Future  -     UA (URINE) with reflex to Scope; Future  -     Cortisol Level, AM Specimen; Future    Encounter for screening for lipid disorder  -     CBC; Future  -     Comprehensive metabolic panel; Future  -     Lipid Panel with Direct LDL reflex; Future  -     TSH, 3rd generation with Free T4 reflex;  Future  -     UA (URINE) with reflex to Scope; Future  -     Cortisol Level, AM Specimen; Future    Need for hepatitis C screening test  -     Hepatitis C Antibody ([facility]); Future  -     CBC; Future  -     Comprehensive metabolic panel; Future  -     Lipid Panel with Direct LDL reflex; Future  -     TSH, 3rd generation with Free T4 reflex; Future  -     UA (URINE) with reflex to Scope; Future  -     Cortisol Level, AM Specimen; Future    Screening for HIV (human immunodeficiency virus)  -     HIV 1/2 Antigen/Antibody (4th Generation) w Reflex [facility]; Future  -     CBC; Future  -     Comprehensive metabolic panel; Future  -     Lipid Panel with Direct LDL reflex; Future  -     TSH, 3rd generation with Free T4 reflex; Future  -     UA (URINE) with reflex to Scope; Future  -     Cortisol Level, AM Specimen; Future    Weight gain  -     CBC; Future  -     Comprehensive metabolic panel; Future  -     Lipid Panel with Direct LDL reflex; Future  -     TSH, 3rd generation with Free T4 reflex; Future  -     UA (URINE) with reflex to Scope; Future  -     Cortisol Level, AM Specimen; Future            Subjective:      Patient ID: Abelardo Russ is a 25 y o  male  CC:    Chief Complaint   Patient presents with   [facility]      NEW patient is here to get established  Patient is requesting blood work  ak       HPI:    Patient is here to 7 self as a patient  Patient without any medical complaints concerns at the present time  Discussed patient's weight  Patient gained over 30 pounds on a psychiatric medicine in the past   He is working to lose the weight  The following portions of the patient's history were reviewed and updated as appropriate: allergies, current medications, past family history, past medical history, past social history, past surgical history and problem list       Review of Systems   Constitutional: Negative  HENT: Negative  Eyes: Negative  Respiratory: Negative  Cardiovascular: Negative      Gastrointestinal: Negative  Endocrine: Negative  Genitourinary: Negative  Musculoskeletal: Negative  Skin: Negative  Allergic/Immunologic: Negative  Neurological: Negative  Hematological: Negative  Psychiatric/Behavioral:        See specialist for ADD/bipolar doing well         Data to review:       Objective:    Vitals:    07/06/22 1619   BP: 108/74   Pulse: 104   Weight: 122 kg (269 lb)   Height: 6' 0 7" (1 847 m)        Physical Exam  Vitals and nursing note reviewed  Constitutional:       Appearance: Normal appearance  HENT:      Head: Normocephalic and atraumatic  Right Ear: Tympanic membrane normal       Left Ear: Tympanic membrane normal       Nose: Nose normal       Mouth/Throat:      Mouth: Mucous membranes are moist       Pharynx: Oropharynx is clear  No oropharyngeal exudate or posterior oropharyngeal erythema  Eyes:      General: No scleral icterus  Neck:      Vascular: No carotid bruit  Cardiovascular:      Rate and Rhythm: Normal rate and regular rhythm  Heart sounds: Normal heart sounds  Pulmonary:      Effort: Pulmonary effort is normal       Breath sounds: Normal breath sounds  Abdominal:      General: Bowel sounds are normal  There is no distension  Palpations: Abdomen is soft  There is no mass  Tenderness: There is no abdominal tenderness  There is no right CVA tenderness, left CVA tenderness, guarding or rebound  Hernia: No hernia is present  Genitourinary:     Penis: Normal        Testes: Normal    Musculoskeletal:      Cervical back: Neck supple  No rigidity or tenderness  Right lower leg: No edema  Left lower leg: No edema  Lymphadenopathy:      Cervical: No cervical adenopathy  Skin:     General: Skin is warm and dry  Neurological:      General: No focal deficit present  Mental Status: He is alert and oriented to person, place, and time  Cranial Nerves: No cranial nerve deficit  Sensory: No sensory deficit  Motor: No weakness  Coordination: Coordination normal       Gait: Gait normal       Deep Tendon Reflexes: Reflexes normal    Psychiatric:         Mood and Affect: Mood normal            BMI Counseling: Body mass index is 35 78 kg/m²  The BMI is above normal  Nutrition recommendations include moderation in carbohydrate intake and reducing intake of cholesterol  Exercise recommendations include exercising 3-5 times per week  Rationale for BMI follow-up plan is due to patient being overweight or obese

## 2022-07-06 NOTE — PATIENT INSTRUCTIONS
Please check if you had your 2nd meningitis B vaccine    If you did not you will need a booster  Continue diet exercise weight loss  Return in 1 year sooner if needed   Complete blood work

## 2022-07-06 NOTE — ASSESSMENT & PLAN NOTE
Exam completed, immunization reviewed  Patient will check if he had his meningitis B booster      Blood work is ordered to include hepatitis-C, HIV, and lipid panel

## 2022-07-25 ENCOUNTER — TELEPHONE (OUTPATIENT)
Dept: PSYCHIATRY | Facility: CLINIC | Age: 22
End: 2022-07-25

## 2022-07-25 NOTE — TELEPHONE ENCOUNTER
Left voicemail asking patient to call the office back to schedule follow up with Dr Makenzie May  Last seen: 6/7/2022    No follow up reminder sent  NO-FOLLOW UP LETTER MAILED TO Inga Wright    ADDRESS: [address]

## 2022-07-25 NOTE — LETTER
22    Daryl Cantor  : 2000  [address]        Dear Inga Wright and/or Parent/Guardian: We are writing to inform you that your last completed medication management appointment with Bryon Mckay MD was on 2022  Your health and follow-up care are important to us  We want to make you aware that you do not have another appointment with Bryon Mckay MD scheduled  Please call our office at [phone] as soon as possible so we can schedule your appointment and prevent an interruption of your care  If you have already scheduled a follow-up appointment, please disregard  If we do not hear from you within 10 business days to make a follow up appointment, we will assume you are no longer interested in care here         Sincerely,      [address] [facility]  E Support Staff

## 2022-08-10 ENCOUNTER — TELEMEDICINE (OUTPATIENT)
Dept: PSYCHIATRY | Facility: CLINIC | Age: 22
End: 2022-08-10
Payer: COMMERCIAL

## 2022-08-10 DIAGNOSIS — F31.32 BIPOLAR 1 DISORDER, DEPRESSED, MODERATE (HCC): Primary | ICD-10-CM

## 2022-08-10 DIAGNOSIS — F90.0 ATTENTION DEFICIT HYPERACTIVITY DISORDER, INATTENTIVE TYPE: ICD-10-CM

## 2022-08-10 PROCEDURE — 90833 PSYTX W PT W E/M 30 MIN: CPT | Performed by: PSYCHIATRY & NEUROLOGY

## 2022-08-10 PROCEDURE — 99214 OFFICE O/P EST MOD 30 MIN: CPT | Performed by: PSYCHIATRY & NEUROLOGY
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
## 2022-08-10 NOTE — PROGRESS NOTES
[facility]    Patient Name Jose De Jesus Daigle     Date of Birth: 25 y o  2000      MRN: [id number]    Date of Referral: August 10, 2022    Current Diagnosis:     1  Bipolar 1 disorder, depressed, moderate ([address] )    2  Attention deficit hyperactivity disorder, inattentive type        Insurance: Payor: BLUE CROSS / Plan: [email or web address] PLAN 280 / Product Type: Blue Fee for Service /     Reason for Referral:      Assist with different diagnosis: Bipolar Disorder, type I   ADHD, Combined type   ASD, personality d/o   Establish cognitive function   Evaluate adaptive functioning   Executive functioning skills: attention, memory      Concerns:     Anxiety   Depression   Inattention      Additional Information Regarding Referrals:   Daryl is a 25 y o male, lives with  with biological mother, stepfather in [facility], college drop out, currently employed full time, [facility] significant for h/o Bipolar Disorder and ADHD had IEP, no prior psychiatric inpatient hospitalization, three partial hospitalization programs, no h/o  self-injurious behaviors, h/o verbal aggression, no h/o of illicit substance abuse, no significant PMH, relationships, has been following up for medication management  He is currently on Strattera 60 mg daily, Wellbutrin  mg daily, Prozac 40 mg daily and Tegretol  mg 2 times daily  Recently there has been concerned that recently family and patient has voiced concern about not being diagnosed with autism spectrum disorder based on their lack of social cues, struggle with executive function  Patient and family are keen to have thorough evaluation to rule out autism or any diagnostic clarification        Keanu Stallworth MD08/10/22

## 2022-08-13 DIAGNOSIS — F90.0 ATTENTION DEFICIT HYPERACTIVITY DISORDER, INATTENTIVE TYPE: ICD-10-CM

## 2022-08-13 DIAGNOSIS — F31.32 BIPOLAR 1 DISORDER, DEPRESSED, MODERATE (HCC): ICD-10-CM

## 2022-08-13 RX ORDER — FLUOXETINE HYDROCHLORIDE 40 MG/1
CAPSULE ORAL
Qty: 90 CAPSULE | Refills: 0 | Status: SHIPPED | OUTPATIENT
Start: 2022-08-13 | End: 2022-10-10

## 2022-10-10 ENCOUNTER — TELEMEDICINE (OUTPATIENT)
Dept: PSYCHIATRY | Facility: CLINIC | Age: 22
End: 2022-10-10
Payer: COMMERCIAL

## 2022-10-10 DIAGNOSIS — F90.0 ATTENTION DEFICIT HYPERACTIVITY DISORDER, INATTENTIVE TYPE: ICD-10-CM

## 2022-10-10 DIAGNOSIS — F31.32 BIPOLAR 1 DISORDER, DEPRESSED, MODERATE (HCC): Primary | ICD-10-CM

## 2022-10-10 PROCEDURE — 99214 OFFICE O/P EST MOD 30 MIN: CPT | Performed by: PSYCHIATRY & NEUROLOGY

## 2022-10-10 RX ORDER — HYDROXYZINE PAMOATE 50 MG/1
50 CAPSULE ORAL
Qty: 30 CAPSULE | Refills: 0 | Status: SHIPPED | OUTPATIENT
Start: 2022-10-10 | End: 2022-10-25

## 2022-10-10 RX ORDER — HYDROXYZINE PAMOATE 50 MG/1
50 CAPSULE ORAL
Qty: 30 CAPSULE | Refills: 0 | Status: SHIPPED | OUTPATIENT
Start: 2022-10-10 | End: 2022-10-10 | Stop reason: SDUPTHER

## 2022-10-10 RX ORDER — ATOMOXETINE 60 MG/1
60 CAPSULE ORAL DAILY
Qty: 90 CAPSULE | Refills: 0 | Status: SHIPPED | OUTPATIENT
Start: 2022-10-10

## 2022-10-10 RX ORDER — BUPROPION HYDROCHLORIDE 150 MG/1
150 TABLET ORAL DAILY
Qty: 90 TABLET | Refills: 0 | Status: SHIPPED | OUTPATIENT
Start: 2022-10-10

## 2022-10-10 RX ORDER — FLUOXETINE HYDROCHLORIDE 40 MG/1
40 CAPSULE ORAL DAILY
Qty: 90 CAPSULE | Refills: 0 | Status: SHIPPED | OUTPATIENT
Start: 2022-10-10
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
## 2022-10-10 NOTE — BH TREATMENT PLAN
TREATMENT PLAN (Medication Management Only)        [facility]    Name/Date of Birth/MRN:  Katy Esquivel 25 y o  2000 MRN: [id number]  Date of Treatment Plan: October 10, 2022  Diagnosis/Diagnoses:   1  Bipolar 1 disorder, depressed, moderate ([facility] [address] )    2  Attention deficit hyperactivity disorder, inattentive type      Strengths/Personal Resources for Self-Care: supportive family, ability to communicate needs, calm  Area/Areas of need (in own words): anger  1  Long Term Goal: getting a good job that I like  maintain control of mood stability, maintain ADHD symptoms  Target Date: 1 year - 10/10/2023  Person/Persons responsible for completion of goal: [address] [place] [address] psychiatrist  2  Short Term Objective (s) - How will we reach this goal?:  Medication, therapy  He will benefit from partial program at this time  A   Provider new recommended medication/dosage changes and/or continue medication(s):  Continue Tegretol  mg 2 times daily, Adderall 20 mg daily at 7:00 am,  Wellbutrin XL to 300 mg daily, Prozac 30 mg daily  B   Attend medication management appointments regularly  Liam Lazo   Continue with outpatient therapist  Target Date: 3 months - 1/10/2023  Person/Persons Responsible for Completion of Goal: Daryl and psychiatrist  Progress Towards Goals: continuing treatment  Progressing slowly  Treatment Modality: medication management every 6 weeks  Review due 90 to 120 days from date of this plan: 3 months - 1/10/2023  Expected length of service: ongoing treatment  My Physician and I have developed this plan together and I agree to work on the goals and objectives  I understand the treatment goals that were developed for my treatment    Electronic Signatures: on file (unless signed below)    Tra Moy MD 10/10/22

## 2022-10-11 PROBLEM — Z00.00 HEALTH CARE MAINTENANCE: Status: RESOLVED | Noted: 2022-07-06 | Resolved: 2022-10-11

## 2022-12-05 ENCOUNTER — TELEPHONE (OUTPATIENT)
Dept: PSYCHIATRY | Facility: CLINIC | Age: 22
End: 2022-12-05

## 2022-12-05 ENCOUNTER — TELEMEDICINE (OUTPATIENT)
Dept: PSYCHIATRY | Facility: CLINIC | Age: 22
End: 2022-12-05

## 2022-12-05 DIAGNOSIS — Z13.228 SCREENING FOR METABOLIC DISORDER: ICD-10-CM

## 2022-12-05 DIAGNOSIS — F90.0 ATTENTION DEFICIT HYPERACTIVITY DISORDER, INATTENTIVE TYPE: ICD-10-CM

## 2022-12-05 DIAGNOSIS — F31.32 BIPOLAR 1 DISORDER, DEPRESSED, MODERATE (HCC): Primary | ICD-10-CM

## 2022-12-05 RX ORDER — CARBAMAZEPINE 200 MG/1
200 TABLET, EXTENDED RELEASE ORAL 2 TIMES DAILY
Qty: 180 TABLET | Refills: 0 | Status: SHIPPED | OUTPATIENT
Start: 2022-12-05

## 2022-12-05 RX ORDER — HYDROXYZINE PAMOATE 50 MG/1
50 CAPSULE ORAL
Qty: 90 CAPSULE | Refills: 0 | Status: SHIPPED | OUTPATIENT
Start: 2022-12-05

## 2022-12-05 RX ORDER — BUPROPION HYDROCHLORIDE 300 MG/1
300 TABLET ORAL DAILY
Qty: 90 TABLET | Refills: 0 | Status: SHIPPED | OUTPATIENT
Start: 2022-12-05

## 2022-12-05 RX ORDER — FLUOXETINE HYDROCHLORIDE 40 MG/1
40 CAPSULE ORAL DAILY
Qty: 90 CAPSULE | Refills: 0 | Status: SHIPPED | OUTPATIENT
Start: 2022-12-05

## 2022-12-05 RX ORDER — ATOMOXETINE 60 MG/1
60 CAPSULE ORAL DAILY
Qty: 90 CAPSULE | Refills: 0 | Status: SHIPPED | OUTPATIENT
Start: 2022-12-05

## 2022-12-05 NOTE — TELEPHONE ENCOUNTER
Left a VM at preferred number:  When blood work is being drawn, suggest the  is aware of blood work requested by Dr Familia Thacker and another provider so that all labs are completed; nursing number given to call with questions
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
## 2022-12-05 NOTE — TELEPHONE ENCOUNTER
Message received by Dr Danielle Michaud today at 3:55 PM:      Jeremy Witt MD  Sylvester Maria C RN  Can you please follow-up with the patient about added labs( Hba1c and Tegretol level) as we discussed  I did mentioned the patient that he must be fasting and take his night dose and the labs was be drawn in the morning prior to his morning dose  Thank you

## 2023-01-13 ENCOUNTER — TELEPHONE (OUTPATIENT)
Dept: NEUROLOGY | Facility: CLINIC | Age: 23
End: 2023-01-13

## 2023-01-13 NOTE — TELEPHONE ENCOUNTER
Left a message letting Daryl know we are sending an GRICELDA out to fill so we can send any records to his insurance for a prior authorization

## 2023-01-20 ENCOUNTER — TELEMEDICINE (OUTPATIENT)
Dept: BEHAVIORAL/MENTAL HEALTH CLINIC | Facility: CLINIC | Age: 23
End: 2023-01-20

## 2023-01-20 DIAGNOSIS — F90.0 ATTENTION DEFICIT HYPERACTIVITY DISORDER, INATTENTIVE TYPE: ICD-10-CM

## 2023-01-20 DIAGNOSIS — F31.32 BIPOLAR 1 DISORDER, DEPRESSED, MODERATE (HCC): Primary | ICD-10-CM

## 2023-01-20 DIAGNOSIS — F84.0 AUTISM SPECTRUM DISORDER: ICD-10-CM

## 2023-01-20 NOTE — PSYCH
Virtual Regular Visit    Verification of patient location:    Patient is located in the following state in which I hold an active license [place]      Assessment/Plan:    Problem List Items Addressed This Visit        Other    Bipolar 1 disorder, depressed, moderate ([facility] [address] ) - Primary    Attention deficit hyperactivity disorder, inattentive type   Other Visit Diagnoses     Autism spectrum disorder              Goals addressed in session: TBD         Reason for visit is   Chief Complaint   Patient presents with   • Virtual Regular Visit        Encounter provider Leonardo Krueger    Provider located at [address] [place] [address] [facility]  [address] [facility] [address]  [phone]      Recent Visits  No visits were found meeting these conditions  Showing recent visits within past 7 days and meeting all other requirements  Future Appointments  No visits were found meeting these conditions  Showing future appointments within next 150 days and meeting all other requirements       The patient was identified by name and date of birth  Yary Ricketts was informed that this is a telemedicine visit and that the visit is being conducted throughthe [employer] platform  He agrees to proceed     My office door was closed  No one else was in the room  He acknowledged consent and understanding of privacy and security of the video platform  The patient has agreed to participate and understands they can discontinue the visit at any time  Patient is aware this is a billable service  Frank Sampson is a 23 y o  male          HPI     Past Medical History:   Diagnosis Date   • ADHD (attention deficit hyperactivity disorder)    • Bipolar disorder ([facility] [address] [facility] )    • Bipolar disorder ([facility] [address] ) 2014       Past Surgical History:   Procedure Laterality Date   • EYE SURGERY  2006    Left eye   • MYRINGOTOMY W/ TUBES Bilateral 2005 • TYMPANOSTOMY TUBE PLACEMENT         Current Outpatient Medications   Medication Sig Dispense Refill   • atoMOXetine (STRATTERA) 60 mg capsule Take 1 capsule (60 mg total) by mouth daily 90 capsule 0   • atoMOXetine (STRATTERA) 60 mg capsule Take 1 capsule (60 mg total) by mouth daily 90 capsule 0   • buPROPion (WELLBUTRIN XL) 300 mg 24 hr tablet Take 1 tablet (300 mg total) by mouth daily 90 tablet 0   • buPROPion (WELLBUTRIN XL) 300 mg 24 hr tablet Take 1 tablet (300 mg total) by mouth daily 90 tablet 0   • carBAMazepine (TEGretol XR) 200 mg 12 hr tablet Take 1 tablet (200 mg total) by mouth 2 (two) times a day 180 tablet 0   • FLUoxetine (PROzac) 40 MG capsule Take 1 capsule (40 mg total) by mouth daily 90 capsule 0   • hydrOXYzine pamoate (VISTARIL) 50 mg capsule Take 1 capsule (50 mg total) by mouth daily at bedtime as needed for anxiety (sleep difficulties) 90 capsule 0     No current facility-administered medications for this visit  Allergies   Allergen Reactions   • Azithromycin Other (See Comments) and Rash   • Penicillins Rash       Review of Systems    Video Exam    There were no vitals filed for this visit  Physical Exam     Behavioral Health Psychotherapy Progress Note    Psychotherapy Provided: Individual Psychotherapy     1  Bipolar 1 disorder, depressed, moderate ([facility] [address] )        2  Attention deficit hyperactivity disorder, inattentive type        3  Autism spectrum disorder            Goals addressed in session: NA    DATA: Therapist met with Daryl's parents to discuss his diagnosis of Autism and how to approach his mental illness  Therapist provided Psychoeducational based on this diagnosis and what they could expect from his diagnosis  Therapist provided support to the family members, as well as education about supportive services that they could access in the future    Therapist will continue to support the family with coping strategies and understanding the Autism diagnosis as necessary  During this session, this clinician used the following therapeutic modalities: Family Therapy and Supportive Psychotherapy    Substance Abuse was not addressed during this session  If the client is diagnosed with a co-occurring substance use disorder, please indicate any changes in the frequency or amount of use: na  Stage of change for addressing substance use diagnoses: No substance use/Not applicable    ASSESSMENT:  Jewel Richter presents with a Euthymic/ normal and na-client was not present during this session' mood  For any risk assessment that surpasses a "low" rating, a safety plan must be developed  A safety plan was indicated: no  If yes, describe in detail     PLAN: Between sessions, Jewel Richter will be supported by his parents, who will contact [employer] to seek Waiver funding  At the next session, the therapist will use Family Therapy to address family support  Behavioral Health Treatment Plan and Discharge Planning: Jewel Richter is aware of and agrees to continue to work on their treatment plan  They have identified and are working toward their discharge goals   yes    Visit start and stop times:    01/20/23  Start Time: 0846  Stop Time: 0932  Total Visit Time: 46 minutes

## 2023-01-25 ENCOUNTER — HOSPITAL ENCOUNTER (OUTPATIENT)
Dept: CT IMAGING | Facility: HOSPITAL | Age: 23
Discharge: HOME | End: 2023-01-25

## 2023-01-25 ENCOUNTER — OFFICE VISIT (OUTPATIENT)
Dept: FAMILY MEDICINE CLINIC | Facility: CLINIC | Age: 23
End: 2023-01-25

## 2023-01-25 ENCOUNTER — APPOINTMENT (OUTPATIENT)
Dept: LAB | Facility: CLINIC | Age: 23
End: 2023-01-25

## 2023-01-25 VITALS
HEART RATE: 117 BPM | HEIGHT: 73 IN | BODY MASS INDEX: 38.3 KG/M2 | DIASTOLIC BLOOD PRESSURE: 82 MMHG | TEMPERATURE: 97.2 F | WEIGHT: 289 LBS | SYSTOLIC BLOOD PRESSURE: 120 MMHG | OXYGEN SATURATION: 99 %

## 2023-01-25 DIAGNOSIS — E78.1 HYPERTRIGLYCERIDEMIA: ICD-10-CM

## 2023-01-25 DIAGNOSIS — F90.0 ATTENTION DEFICIT HYPERACTIVITY DISORDER, INATTENTIVE TYPE: ICD-10-CM

## 2023-01-25 DIAGNOSIS — R10.9 ACUTE LEFT FLANK PAIN: ICD-10-CM

## 2023-01-25 DIAGNOSIS — M54.50 ACUTE LEFT-SIDED LOW BACK PAIN WITHOUT SCIATICA: ICD-10-CM

## 2023-01-25 DIAGNOSIS — E66.01 SEVERE OBESITY (BMI 35.0-39.9) WITH COMORBIDITY (HCC): ICD-10-CM

## 2023-01-25 DIAGNOSIS — Z11.4 SCREENING FOR HIV (HUMAN IMMUNODEFICIENCY VIRUS): ICD-10-CM

## 2023-01-25 DIAGNOSIS — F31.32 BIPOLAR 1 DISORDER, DEPRESSED, MODERATE (HCC): ICD-10-CM

## 2023-01-25 DIAGNOSIS — Z11.59 NEED FOR HEPATITIS C SCREENING TEST: ICD-10-CM

## 2023-01-25 DIAGNOSIS — R10.32 LEFT LOWER QUADRANT ABDOMINAL PAIN: ICD-10-CM

## 2023-01-25 DIAGNOSIS — Z13.228 SCREENING FOR METABOLIC DISORDER: ICD-10-CM

## 2023-01-25 DIAGNOSIS — R10.32 LEFT LOWER QUADRANT ABDOMINAL PAIN: Primary | ICD-10-CM

## 2023-01-25 PROBLEM — E66.9 OBESITY (BMI 30-39.9): Status: RESOLVED | Noted: 2022-07-06 | Resolved: 2023-01-25

## 2023-01-25 LAB
ALBUMIN SERPL BCP-MCNC: 4.2 G/DL (ref 3.5–5)
ALP SERPL-CCNC: 72 U/L (ref 46–116)
ALT SERPL W P-5'-P-CCNC: 47 U/L (ref 12–78)
AMYLASE SERPL-CCNC: 44 IU/L (ref 25–115)
ANION GAP SERPL CALCULATED.3IONS-SCNC: 4 MMOL/L (ref 4–13)
AST SERPL W P-5'-P-CCNC: 20 U/L (ref 5–45)
BILIRUB SERPL-MCNC: 0.28 MG/DL (ref 0.2–1)
BUN SERPL-MCNC: 10 MG/DL (ref 5–25)
CALCIUM SERPL-MCNC: 9.5 MG/DL (ref 8.3–10.1)
CARBAMAZEPINE SERPL-MCNC: 3.2 UG/ML (ref 4–12)
CHLORIDE SERPL-SCNC: 104 MMOL/L (ref 96–108)
CHOLEST SERPL-MCNC: 176 MG/DL
CO2 SERPL-SCNC: 29 MMOL/L (ref 21–32)
CREAT SERPL-MCNC: 0.81 MG/DL (ref 0.6–1.3)
ERYTHROCYTE [DISTWIDTH] IN BLOOD BY AUTOMATED COUNT: 12.4 % (ref 11.6–15.1)
EST. AVERAGE GLUCOSE BLD GHB EST-MCNC: 103 MG/DL
GFR SERPL CREATININE-BSD FRML MDRD: 127 ML/MIN/1.73SQ M
GLUCOSE SERPL-MCNC: 97 MG/DL (ref 65–140)
HBA1C MFR BLD: 5.2 %
HCT VFR BLD AUTO: 42.6 % (ref 36.5–49.3)
HDLC SERPL-MCNC: 48 MG/DL
HGB BLD-MCNC: 13.7 G/DL (ref 12–17)
LDLC SERPL CALC-MCNC: 99 MG/DL (ref 0–100)
LIPASE SERPL-CCNC: 80 U/L (ref 73–393)
MCH RBC QN AUTO: 28.6 PG (ref 26.8–34.3)
MCHC RBC AUTO-ENTMCNC: 32.2 G/DL (ref 31.4–37.4)
MCV RBC AUTO: 89 FL (ref 82–98)
PLATELET # BLD AUTO: 346 THOUSANDS/UL (ref 149–390)
PMV BLD AUTO: 10.6 FL (ref 8.9–12.7)
POTASSIUM SERPL-SCNC: 3.9 MMOL/L (ref 3.5–5.3)
PROT SERPL-MCNC: 7.5 G/DL (ref 6.4–8.4)
RBC # BLD AUTO: 4.79 MILLION/UL (ref 3.88–5.62)
SODIUM SERPL-SCNC: 137 MMOL/L (ref 135–147)
TRIGL SERPL-MCNC: 147 MG/DL
TSH SERPL DL<=0.05 MIU/L-ACNC: 0.8 UIU/ML (ref 0.45–4.5)
WBC # BLD AUTO: 6.12 THOUSAND/UL (ref 4.31–10.16)

## 2023-01-25 NOTE — PATIENT INSTRUCTIONS
Complete blood work today, even if nonfasting  Complete CT scan today  May use Tylenol for pain or discomfort  Recheck tomorrow  If symptoms worsen overnight report to ER  Diet exercise weight loss recommended

## 2023-01-25 NOTE — PROGRESS NOTES
Name: Zeb Garvey      : 2000      MRN: [id number]  Encounter Provider: Emily Lantigua DO  Encounter Date: 2023   Encounter department: [facility] PRIMARY CARE    Assessment & Plan     1  Abdominal pain left lower quadrant  2  Acute left flank pain #3  Acute left low back pain  Check CT rule out stone or other etiology  Complete blood work  May use Tylenol  4  BMI 38 44 diet exercise weight loss recommended  5  Bipolar/ADHD, stable follows with psychiatry #6  Hypertriglyceridemia blood work ordered  7  Recheck tomorrow if pain worsens report to ER        1  Left lower quadrant abdominal pain  -     CBC; Future; Expected date: 2023  -     Comprehensive metabolic panel; Future; Expected date: 2023  -     UA (URINE) with reflex to Scope; Future; Expected date: 2023  -     Amylase; Future; Expected date: 2023  -     Lipase; Future; Expected date: 2023  -     CT abdomen pelvis wo contrast; Future; Expected date: 2023  -     TSH, 3rd generation with Free T4 reflex; Future; Expected date: 2023  -     Lipid Panel with Direct LDL reflex; Future; Expected date: 2023    2  Acute left flank pain  -     CBC; Future; Expected date: 2023  -     Comprehensive metabolic panel; Future; Expected date: 2023  -     UA (URINE) with reflex to Scope; Future; Expected date: 2023  -     Amylase; Future; Expected date: 2023  -     Lipase; Future; Expected date: 2023  -     CT abdomen pelvis wo contrast; Future; Expected date: 2023  -     TSH, 3rd generation with Free T4 reflex; Future; Expected date: 2023  -     Lipid Panel with Direct LDL reflex; Future; Expected date: 2023    3  Acute left-sided low back pain without sciatica  -     CBC; Future; Expected date: 2023  -     Comprehensive metabolic panel; Future; Expected date: 2023  -     UA (URINE) with reflex to Scope;  Future; Expected date: 01/25/2023  -     Amylase; Future; Expected date: 01/25/2023  -     Lipase; Future; Expected date: 01/25/2023  -     CT abdomen pelvis wo contrast; Future; Expected date: 01/25/2023  -     TSH, 3rd generation with Free T4 reflex; Future; Expected date: 01/25/2023  -     Lipid Panel with Direct LDL reflex; Future; Expected date: 01/25/2023    4  Severe obesity (BMI 35 0-39  9) with comorbidity ([facility] [address] )  Assessment & Plan:  BMI 38 44, patient has gained 15 pounds as last office visit diet exercise weight loss recommended    Orders:  -     CBC; Future; Expected date: 01/25/2023  -     Comprehensive metabolic panel; Future; Expected date: 01/25/2023  -     UA (URINE) with reflex to Scope; Future; Expected date: 01/25/2023  -     Amylase; Future; Expected date: 01/25/2023  -     Lipase; Future; Expected date: 01/25/2023  -     CT abdomen pelvis wo contrast; Future; Expected date: 01/25/2023  -     TSH, 3rd generation with Free T4 reflex; Future; Expected date: 01/25/2023  -     Lipid Panel with Direct LDL reflex; Future; Expected date: 01/25/2023    5  Bipolar 1 disorder, depressed, moderate (HCC)  Assessment & Plan:  Stable follows with psychiatry    Orders:  -     CBC; Future; Expected date: 01/25/2023  -     Comprehensive metabolic panel; Future; Expected date: 01/25/2023  -     UA (URINE) with reflex to Scope; Future; Expected date: 01/25/2023  -     Amylase; Future; Expected date: 01/25/2023  -     Lipase; Future; Expected date: 01/25/2023  -     CT abdomen pelvis wo contrast; Future; Expected date: 01/25/2023  -     TSH, 3rd generation with Free T4 reflex; Future; Expected date: 01/25/2023  -     Lipid Panel with Direct LDL reflex; Future; Expected date: 01/25/2023    6  Hypertriglyceridemia  Assessment & Plan:  Blood work ordered    Orders:  -     CBC; Future; Expected date: 01/25/2023  -     Comprehensive metabolic panel; Future; Expected date: 01/25/2023  -     UA (URINE) with reflex to Scope;  Future; Expected date: 01/25/2023  -     Amylase; Future; Expected date: 01/25/2023  -     Lipase; Future; Expected date: 01/25/2023  -     CT abdomen pelvis wo contrast; Future; Expected date: 01/25/2023  -     TSH, 3rd generation with Free T4 reflex; Future; Expected date: 01/25/2023  -     Lipid Panel with Direct LDL reflex; Future; Expected date: 01/25/2023    7  Attention deficit hyperactivity disorder, inattentive type  Assessment & Plan:  Stable follows with psychiatry        BMI Counseling: Body mass index is 38 44 kg/m²  The BMI is above normal  Nutrition recommendations include moderation in carbohydrate intake and reducing intake of cholesterol  Exercise recommendations include exercising 3-5 times per week  Rationale for BMI follow-up plan is due to patient being overweight or obese  Subjective      Patient states for the past 2 to 3 months he has had some vague abdominal pains cannot really locate those pains that come and go no nausea vomiting constipation diarrhea no change in stool  No fever chills  However the past couple days he is getting increasing pain left flank seem to be radiating to left inguinal area  No history of trauma  No urinary complaints    Review of Systems   Constitutional:        HPI   HENT: Negative  Eyes: Negative  Respiratory: Negative  Cardiovascular: Negative  Gastrointestinal:        HPI   Endocrine: Negative  Genitourinary:        HPI   Skin: Negative  Allergic/Immunologic: Negative  Neurological: Negative  Hematological: Negative  Psychiatric/Behavioral: Negative          Current Outpatient Medications on File Prior to Visit   Medication Sig   • atoMOXetine (STRATTERA) 60 mg capsule Take 1 capsule (60 mg total) by mouth daily   • buPROPion (WELLBUTRIN XL) 300 mg 24 hr tablet Take 1 tablet (300 mg total) by mouth daily   • carBAMazepine (TEGretol XR) 200 mg 12 hr tablet Take 1 tablet (200 mg total) by mouth 2 (two) times a day   • FLUoxetine (PROzac) 40 MG capsule Take 1 capsule (40 mg total) by mouth daily   • hydrOXYzine pamoate (VISTARIL) 50 mg capsule Take 1 capsule (50 mg total) by mouth daily at bedtime as needed for anxiety (sleep difficulties)   • atoMOXetine (STRATTERA) 60 mg capsule Take 1 capsule (60 mg total) by mouth daily   • buPROPion (WELLBUTRIN XL) 300 mg 24 hr tablet Take 1 tablet (300 mg total) by mouth daily   • [DISCONTINUED] desvenlafaxine succinate (PRISTIQ) 50 mg 24 hr tablet Take 1 tablet (50 mg total) by mouth daily       Objective     /82 (BP Location: Right arm, Patient Position: Sitting, Cuff Size: Standard)   Pulse (!) 117   Temp (!) 97 2 °F (36 2 °C) (Tympanic)   Ht 6' 0 7" (1 847 m)   Wt 131 kg (289 lb)   SpO2 99%   BMI 38 44 kg/m²     Physical Exam  Vitals and nursing note reviewed  Constitutional:       Appearance: He is well-developed  HENT:      Head: Normocephalic and atraumatic  Eyes:      General: No scleral icterus  Cardiovascular:      Rate and Rhythm: Normal rate and regular rhythm  Pulses: Normal pulses  Heart sounds: Normal heart sounds  Pulmonary:      Effort: Pulmonary effort is normal       Breath sounds: Normal breath sounds  Abdominal:      General: Bowel sounds are normal  There is no distension  Palpations: Abdomen is soft  There is no mass  Tenderness: There is abdominal tenderness  There is left CVA tenderness  There is no right CVA tenderness, guarding or rebound  Hernia: No hernia is present  Comments: Patient with some mild left lower quadrant tenderness and left CVA tenderness   Musculoskeletal:      Cervical back: Neck supple  No rigidity  Right lower leg: No edema  Left lower leg: No edema  Lymphadenopathy:      Cervical: No cervical adenopathy  Skin:     General: Skin is warm and dry  Neurological:      General: No focal deficit present  Mental Status: He is alert     Psychiatric:         Mood and Affect: Mood normal        Benedicto Addison Jay, DO

## 2023-01-26 ENCOUNTER — OFFICE VISIT (OUTPATIENT)
Dept: FAMILY MEDICINE CLINIC | Facility: CLINIC | Age: 23
End: 2023-01-26

## 2023-01-26 VITALS
TEMPERATURE: 98.7 F | HEART RATE: 98 BPM | SYSTOLIC BLOOD PRESSURE: 118 MMHG | OXYGEN SATURATION: 97 % | DIASTOLIC BLOOD PRESSURE: 72 MMHG

## 2023-01-26 DIAGNOSIS — F90.0 ATTENTION DEFICIT HYPERACTIVITY DISORDER, INATTENTIVE TYPE: ICD-10-CM

## 2023-01-26 DIAGNOSIS — J02.9 SORE THROAT: ICD-10-CM

## 2023-01-26 DIAGNOSIS — I88.0 MESENTERIC ADENITIS: ICD-10-CM

## 2023-01-26 DIAGNOSIS — E78.1 HYPERTRIGLYCERIDEMIA: ICD-10-CM

## 2023-01-26 DIAGNOSIS — R10.32 LEFT LOWER QUADRANT ABDOMINAL PAIN: Primary | ICD-10-CM

## 2023-01-26 DIAGNOSIS — E66.01 SEVERE OBESITY (BMI 35.0-39.9) WITH COMORBIDITY (HCC): ICD-10-CM

## 2023-01-26 DIAGNOSIS — J30.9 ALLERGIC RHINITIS, UNSPECIFIED SEASONALITY, UNSPECIFIED TRIGGER: ICD-10-CM

## 2023-01-26 DIAGNOSIS — H66.002 ACUTE SUPPURATIVE OTITIS MEDIA OF LEFT EAR WITHOUT SPONTANEOUS RUPTURE OF TYMPANIC MEMBRANE, RECURRENCE NOT SPECIFIED: ICD-10-CM

## 2023-01-26 DIAGNOSIS — R10.9 ACUTE LEFT FLANK PAIN: ICD-10-CM

## 2023-01-26 DIAGNOSIS — H69.80 DYSFUNCTION OF EUSTACHIAN TUBE, UNSPECIFIED LATERALITY: ICD-10-CM

## 2023-01-26 DIAGNOSIS — F31.32 BIPOLAR 1 DISORDER, DEPRESSED, MODERATE (HCC): ICD-10-CM

## 2023-01-26 PROBLEM — H69.90 EUSTACHIAN TUBE DYSFUNCTION: Status: ACTIVE | Noted: 2023-01-26

## 2023-01-26 LAB
HCV AB SER QL: NORMAL
HIV 1+2 AB+HIV1 P24 AG SERPL QL IA: NORMAL
HIV 2 AB SERPL QL IA: NORMAL
HIV1 AB SERPL QL IA: NORMAL
HIV1 P24 AG SERPL QL IA: NORMAL

## 2023-01-26 RX ORDER — DOXYCYCLINE HYCLATE 100 MG/1
100 CAPSULE ORAL EVERY 12 HOURS SCHEDULED
Qty: 20 CAPSULE | Refills: 0 | Status: SHIPPED | OUTPATIENT
Start: 2023-01-26 | End: 2023-02-05

## 2023-01-26 RX ORDER — AZELASTINE 1 MG/ML
2 SPRAY, METERED NASAL 2 TIMES DAILY
Qty: 30 ML | Refills: 3 | Status: SHIPPED | OUTPATIENT
Start: 2023-01-26

## 2023-01-27 NOTE — PROGRESS NOTES
Name: Elma Sever      : 2000      MRN: [id number]  Encounter Provider: Pollo Redd DO  Encounter Date: 2023   Encounter department: [facility] PRIMARY CARE    Assessment & Plan     1  Left lower quadrant pain  2  Acute left flank pain  3  Mesenteric adenitis on CT  CT blood work discussed  Patient symptom-free at the present time if recurs return to office  4  Pure allergic rhinitis #5 eustachian tube dysfunction #6 acute sore throat secondary postnasal drip  In light of below patient will be placed on antibiotics so no strep test will be completed tonight  7  Otitis media, doxycycline ordered  8  ADHD/bipolar, stable Tegretol level is low patient follows psychiatry  9  Hypertriglyceridemia, diet controlled  10  BMI 38 44 diet exercise weight loss recommended  11  Return in 1 week if still symptoms otherwise return in 1 year for routine exam      1  Left lower quadrant abdominal pain    2  Acute left flank pain    3  Mesenteric adenitis    4  Allergic rhinitis, unspecified seasonality, unspecified trigger  Assessment & Plan:  Astelin prescribed    Orders:  -     azelastine (ASTELIN) 0 1 % nasal spray; 2 sprays into each nostril 2 (two) times a day Use in each nostril as directed    5  Dysfunction of Eustachian tube, unspecified laterality  Assessment & Plan:  Astelin ordered    Orders:  -     azelastine (ASTELIN) 0 1 % nasal spray; 2 sprays into each nostril 2 (two) times a day Use in each nostril as directed    6  Acute suppurative otitis media of left ear without spontaneous rupture of tympanic membrane, recurrence not specified  -     doxycycline hyclate (VIBRAMYCIN) 100 mg capsule; Take 1 capsule (100 mg total) by mouth every 12 (twelve) hours for 10 days    7  Sore throat    8  Attention deficit hyperactivity disorder, inattentive type  Assessment & Plan:  Follows with psychiatry      9   Bipolar 1 disorder, depressed, moderate ([facility] [address] )  Assessment & Plan:  As above      10  Severe obesity (BMI 35 0-39  9) with comorbidity ([address] )  Assessment & Plan:  BMI 38 44 diet exercise weight loss recommended      11  Hypertriglyceridemia  Assessment & Plan:  Diet controlled             Subjective      Patient doing much better patient's abdominal pain has resolved  CT was discussed just mild enteric adenitis, benign  Blood work was discussed  Patient does have some head congestion mild otalgia no sore throat  COVID was negative last evening    Review of Systems   Constitutional: Negative  HENT:        HPI   Eyes: Negative  Respiratory: Negative  Cardiovascular: Negative  Gastrointestinal: Negative  Endocrine: Negative  Genitourinary: Negative  Musculoskeletal: Negative  Skin: Negative  Allergic/Immunologic: Negative  Neurological: Negative  Hematological: Negative  Psychiatric/Behavioral: Negative          Current Outpatient Medications on File Prior to Visit   Medication Sig   • atoMOXetine (STRATTERA) 60 mg capsule Take 1 capsule (60 mg total) by mouth daily   • atoMOXetine (STRATTERA) 60 mg capsule Take 1 capsule (60 mg total) by mouth daily   • buPROPion (WELLBUTRIN XL) 300 mg 24 hr tablet Take 1 tablet (300 mg total) by mouth daily   • buPROPion (WELLBUTRIN XL) 300 mg 24 hr tablet Take 1 tablet (300 mg total) by mouth daily   • carBAMazepine (TEGretol XR) 200 mg 12 hr tablet Take 1 tablet (200 mg total) by mouth 2 (two) times a day   • FLUoxetine (PROzac) 40 MG capsule Take 1 capsule (40 mg total) by mouth daily   • hydrOXYzine pamoate (VISTARIL) 50 mg capsule Take 1 capsule (50 mg total) by mouth daily at bedtime as needed for anxiety (sleep difficulties)   • [DISCONTINUED] desvenlafaxine succinate (PRISTIQ) 50 mg 24 hr tablet Take 1 tablet (50 mg total) by mouth daily       Objective     /72 (BP Location: Right arm, Patient Position: Sitting, Cuff Size: Large)   Pulse 98   Temp 98 7 °F (37 1 °C) (Temporal)   SpO2 97%     Physical Exam  Vitals and nursing note reviewed  Constitutional:       Appearance: Normal appearance  HENT:      Head: Normocephalic and atraumatic  Ears:      Comments: Both tympanic membranes are dull left TM is injected     Nose:      Comments: Positive allergic turbinate     Mouth/Throat:      Comments: Mild pharyngeal injection negative exudate  Copious clear postnasal drip  Eyes:      General: No scleral icterus  Cardiovascular:      Rate and Rhythm: Normal rate and regular rhythm  Heart sounds: Normal heart sounds  Pulmonary:      Effort: Pulmonary effort is normal       Breath sounds: Normal breath sounds  Abdominal:      General: Bowel sounds are normal  There is no distension  Palpations: Abdomen is soft  There is no mass  Tenderness: There is no abdominal tenderness  There is no right CVA tenderness, left CVA tenderness, guarding or rebound  Hernia: No hernia is present  Musculoskeletal:      Cervical back: Neck supple  No rigidity or tenderness  Right lower leg: No edema  Left lower leg: No edema  Lymphadenopathy:      Cervical: Cervical adenopathy present  Skin:     General: Skin is warm and dry  Neurological:      General: No focal deficit present  Mental Status: He is alert     Psychiatric:         Mood and Affect: Mood normal        Tico Thompson DO

## 2023-01-27 NOTE — PATIENT INSTRUCTIONS
Finish antibiotic, doxycycline twice daily for 10 days  Use Astelin nasal spray 2 sprays both nostrils twice daily for 2 weeks and as needed  Return in 1 week if still symptoms  Diet exercise weight loss recommended  Following with psychiatry per their instructions  Return in 1 year for checkup

## 2023-02-13 ENCOUNTER — TELEPHONE (OUTPATIENT)
Dept: PSYCHIATRY | Facility: CLINIC | Age: 23
End: 2023-02-13

## 2023-02-16 ENCOUNTER — TELEPHONE (OUTPATIENT)
Dept: PSYCHIATRY | Facility: CLINIC | Age: 23
End: 2023-02-16

## 2023-02-16 NOTE — TELEPHONE ENCOUNTER
After February 1 prior authorization is not required for the CPT codes used for psychological testing based on members plan

## 2023-02-27 ENCOUNTER — TELEMEDICINE (OUTPATIENT)
Dept: BEHAVIORAL/MENTAL HEALTH CLINIC | Facility: CLINIC | Age: 23
End: 2023-02-27

## 2023-02-27 DIAGNOSIS — F84.0 AUTISM SPECTRUM DISORDER: ICD-10-CM

## 2023-02-27 DIAGNOSIS — F31.32 BIPOLAR 1 DISORDER, DEPRESSED, MODERATE (HCC): Primary | ICD-10-CM

## 2023-02-27 DIAGNOSIS — F90.0 ATTENTION DEFICIT HYPERACTIVITY DISORDER, INATTENTIVE TYPE: ICD-10-CM

## 2023-02-27 NOTE — PSYCH
Virtual Regular Visit    Verification of patient location:    Patient is located in the following state in which I hold an active license [place]      Assessment/Plan:    Problem List Items Addressed This Visit        Other    Bipolar 1 disorder, depressed, moderate ([facility] [address] ) - Primary    Attention deficit hyperactivity disorder, inattentive type   Other Visit Diagnoses     Autism spectrum disorder              Goals addressed in session: Goal 1 and Goal 2          Reason for visit is   Chief Complaint   Patient presents with   • Virtual Regular Visit        Encounter provider Gwendolyn Medel    Provider located at [address] [place] [address] [facility]  [address] [facility] [address] [facility] [id number] [phone]      Recent Visits  No visits were found meeting these conditions  Showing recent visits within past 7 days and meeting all other requirements  Today's Visits  Date Type Provider Dept   02/27/23 Telemedicine Gwendolyn Medel [facility]  [address]   Showing today's visits and meeting all other requirements  Future Appointments  No visits were found meeting these conditions  Showing future appointments within next 150 days and meeting all other requirements       The patient was identified by name and date of birth  Espinoza Cheung was informed that this is a telemedicine visit and that the visit is being conducted throughthe [employer] platform  He agrees to proceed     My office door was closed  No one else was in the room  He acknowledged consent and understanding of privacy and security of the video platform  The patient has agreed to participate and understands they can discontinue the visit at any time  Patient is aware this is a billable service  Immanuel Pabon is a 23 y o  male          HPI     Past Medical History:   Diagnosis Date   • ADHD (attention deficit hyperactivity disorder)    • Bipolar disorder ([address] )    • Bipolar disorder ([place] [address] ) 2014       Past Surgical History:   Procedure Laterality Date   • EYE SURGERY  2006    Left eye   • MYRINGOTOMY W/ TUBES Bilateral 2005   • TYMPANOSTOMY TUBE PLACEMENT         Current Outpatient Medications   Medication Sig Dispense Refill   • atoMOXetine (STRATTERA) 60 mg capsule Take 1 capsule (60 mg total) by mouth daily 90 capsule 0   • atoMOXetine (STRATTERA) 60 mg capsule Take 1 capsule (60 mg total) by mouth daily 90 capsule 0   • azelastine (ASTELIN) 0 1 % nasal spray 2 sprays into each nostril 2 (two) times a day Use in each nostril as directed 30 mL 3   • buPROPion (WELLBUTRIN XL) 300 mg 24 hr tablet Take 1 tablet (300 mg total) by mouth daily 90 tablet 0   • buPROPion (WELLBUTRIN XL) 300 mg 24 hr tablet Take 1 tablet (300 mg total) by mouth daily 90 tablet 0   • carBAMazepine (TEGretol XR) 200 mg 12 hr tablet Take 1 tablet (200 mg total) by mouth 2 (two) times a day 180 tablet 0   • FLUoxetine (PROzac) 40 MG capsule Take 1 capsule (40 mg total) by mouth daily 90 capsule 0   • hydrOXYzine pamoate (VISTARIL) 50 mg capsule Take 1 capsule (50 mg total) by mouth daily at bedtime as needed for anxiety (sleep difficulties) 90 capsule 0     No current facility-administered medications for this visit  Allergies   Allergen Reactions   • Azithromycin Other (See Comments) and Rash   • Penicillins Rash       Review of Systems    Video Exam    There were no vitals filed for this visit  Physical Exam     Behavioral Health Psychotherapy Progress Note    Psychotherapy Provided: Family Therapy    1  Bipolar 1 disorder, depressed, moderate ([address] )        2  Attention deficit hyperactivity disorder, inattentive type        3  Autism spectrum disorder            Goals addressed in session: Goal 1 and Goal 2     DATA: Therapist met with Mom and Step Dad to explore services that are available to [facility] Adelia An    Therapist also provided support to parents, as well as education based on Autism Diagnosis  During this session, this clinician used the following therapeutic modalities: Supportive Psychotherapy and Psychoeducation, Casemanagement    Substance Abuse was not addressed during this session  If the client is diagnosed with a co-occurring substance use disorder, please indicate any changes in the frequency or amount of use: na  Stage of change for addressing substance use diagnoses: No substance use/Not applicable    ASSESSMENT:  Yary Ricketts presents with a Euthymic/ normal and na mood  his affect is na, which is not congruent, with his mood and the content of the session  The client has made progress on their goals  NA- Yary Ricketts presents with a none risk of suicide, none risk of self-harm, and none risk of harm to others  For any risk assessment that surpasses a "low" rating, a safety plan must be developed  A safety plan was indicated: no  If yes, describe in detail     PLAN: Between sessions, Yary Ricketts will Continue to follow through with recommended resource contact  At the next session, the therapist will use Supportive Psychotherapy to address autism support resources  Behavioral Health Treatment Plan and Discharge Planning: Yary Ricketts is aware of and agrees to continue to work on their treatment plan  They have identified and are working toward their discharge goals   yes    Visit start and stop times:    02/27/23  Start Time: 1340  Stop Time: 1430  Total Visit Time: 50 minutes

## 2023-03-14 ENCOUNTER — TELEPHONE (OUTPATIENT)
Dept: HEMATOLOGY ONCOLOGY | Facility: CLINIC | Age: 23
End: 2023-03-14

## 2023-03-14 ENCOUNTER — OFFICE VISIT (OUTPATIENT)
Dept: FAMILY MEDICINE CLINIC | Facility: CLINIC | Age: 23
End: 2023-03-14

## 2023-03-14 VITALS
TEMPERATURE: 98.6 F | HEIGHT: 78 IN | OXYGEN SATURATION: 100 % | RESPIRATION RATE: 18 BRPM | BODY MASS INDEX: 33.78 KG/M2 | SYSTOLIC BLOOD PRESSURE: 130 MMHG | WEIGHT: 292 LBS | DIASTOLIC BLOOD PRESSURE: 80 MMHG | HEART RATE: 94 BPM

## 2023-03-14 DIAGNOSIS — J30.9 ALLERGIC RHINITIS, UNSPECIFIED SEASONALITY, UNSPECIFIED TRIGGER: ICD-10-CM

## 2023-03-14 DIAGNOSIS — F84.0 AUTISM SPECTRUM DISORDER: ICD-10-CM

## 2023-03-14 DIAGNOSIS — F31.32 BIPOLAR 1 DISORDER, DEPRESSED, MODERATE (HCC): ICD-10-CM

## 2023-03-14 DIAGNOSIS — H65.03 BILATERAL ACUTE SEROUS OTITIS MEDIA, RECURRENCE NOT SPECIFIED: ICD-10-CM

## 2023-03-14 DIAGNOSIS — F90.0 ATTENTION DEFICIT HYPERACTIVITY DISORDER, INATTENTIVE TYPE: ICD-10-CM

## 2023-03-14 DIAGNOSIS — D80.3 SELECTIVE DEFICIENCY OF IGG SUBCLASSES (HCC): ICD-10-CM

## 2023-03-14 DIAGNOSIS — J02.9 SORE THROAT: Primary | ICD-10-CM

## 2023-03-14 DIAGNOSIS — E55.9 VITAMIN D DEFICIENCY: ICD-10-CM

## 2023-03-14 DIAGNOSIS — H69.80 DYSFUNCTION OF EUSTACHIAN TUBE, UNSPECIFIED LATERALITY: ICD-10-CM

## 2023-03-14 DIAGNOSIS — J01.40 ACUTE PANSINUSITIS, RECURRENCE NOT SPECIFIED: ICD-10-CM

## 2023-03-14 DIAGNOSIS — R79.89 LOW SERUM T4 LEVEL: ICD-10-CM

## 2023-03-14 RX ORDER — DOXYCYCLINE HYCLATE 100 MG/1
100 CAPSULE ORAL EVERY 12 HOURS SCHEDULED
Qty: 20 CAPSULE | Refills: 0 | Status: SHIPPED | OUTPATIENT
Start: 2023-03-14 | End: 2023-03-24

## 2023-03-14 RX ORDER — PREDNISONE 10 MG/1
10 TABLET ORAL DAILY
Qty: 7 TABLET | Refills: 0 | Status: SHIPPED | OUTPATIENT
Start: 2023-03-14 | End: 2023-03-21

## 2023-03-14 RX ORDER — ERGOCALCIFEROL 1.25 MG/1
50000 CAPSULE ORAL WEEKLY
Qty: 4 CAPSULE | Refills: 5 | Status: SHIPPED | OUTPATIENT
Start: 2023-03-14

## 2023-03-14 NOTE — LETTER
March 14, 2023     Patient: Deandre Mendez   YOB: 2000   Date of Visit: 3/14/2023       To Whom It May Concern: It is my medical opinion that Deandre Mendez may return to work on 3/16/2023  If you have any questions or concerns, please don't hesitate to call           Sincerely,        Tico Thompson DO    CC: No Recipients

## 2023-03-14 NOTE — PROGRESS NOTES
Name: Deandre Mendez      : 2000      MRN: [id number]  Encounter Provider: Lev Ocampo DO  Encounter Date: 3/14/2023   Encounter department: [place] [address]     1  Sore throat nasal congestion mild cough  COVID/flu swab sent to lab  Remain isolated until report is known  2  Allergic rhinitis/eustachian tube dysfunction, use Astelin nasal spray  3  ADD/autism/bipolar, stable follows with psychiatry  4  Low serum T4 we will repeat T4, TSH, T3 in 6 weeks  Patient's thyroid antibodies were negative  5  IgG deficiency, referred to hematology, Dr Juani Razo this was ordered by patient's psychiatrist but is not being followed by them  6  Vitamin D deficiency, start vitamin D 50,000 units once  7  Acute sinusitis, doxycycline is ordered secondary to patient's allergies to penicillin and Zithromax  8  Serous otitis media, prednisone ordered  9  Return in 1 week if still with symptoms  10  Return in 2 months for office visit and blood work sooner if needed      1  Sore throat  -     Covid/Flu- Office Collect  -     predniSONE 10 mg tablet; Take 1 tablet (10 mg total) by mouth daily for 7 days    2  Allergic rhinitis, unspecified seasonality, unspecified trigger  Assessment & Plan:  Use Astelin nasal spray twice daily    Orders:  -     predniSONE 10 mg tablet; Take 1 tablet (10 mg total) by mouth daily for 7 days    3  Dysfunction of Eustachian tube, unspecified laterality  Assessment & Plan:  As above    Orders:  -     predniSONE 10 mg tablet; Take 1 tablet (10 mg total) by mouth daily for 7 days    4  Attention deficit hyperactivity disorder, inattentive type  Assessment & Plan:  Stable follows psychiatry      5  Autism spectrum disorder  Assessment & Plan:  As above      6  Bipolar 1 disorder, depressed, moderate (HCC)  Assessment & Plan:  As above      7   Low serum T4 level  Assessment & Plan:  Repeat TSH, T3, T4, thyroid antibodies negative    Orders:  -     T4; Future; Expected date: 05/01/2023  -     TSH, 3rd generation with Free T4 reflex; Future; Expected date: 05/01/2023  -     T3; Future; Expected date: 05/01/2023    8  Selective deficiency of IgG subclasses (HCC)  Assessment & Plan:  Discussed with patient mother  Patient needs a hematologist will refer to Dr Leslie Hendricks    Orders:  -     Ambulatory Referral to Hematology / Oncology; Future    9  Vitamin D deficiency  Assessment & Plan:  Start vitamin D 50,000 units weekly    Orders:  -     ergocalciferol (VITAMIN D2) 50,000 units; Take 1 capsule (50,000 Units total) by mouth once a week  -     Vitamin D 25 hydroxy; Future; Expected date: 05/01/2023    10  Acute pansinusitis, recurrence not specified  -     doxycycline hyclate (VIBRAMYCIN) 100 mg capsule; Take 1 capsule (100 mg total) by mouth every 12 (twelve) hours for 10 days    11  Bilateral acute serous otitis media, recurrence not specified  -     predniSONE 10 mg tablet; Take 1 tablet (10 mg total) by mouth daily for 7 days           Subjective      Patient started yesterday with mild dry cough frontal headache and nasal congestion  Did a COVID test last night and this morning at home was negative  Patient also sees a psychiatry, Dr Uma Montes, who did a multitude of blood work  And mother told me that they are not following this up patient was told he should see their family doctor  Review of Systems   Constitutional: Negative for chills and fever  HENT:        HPI   Eyes: Negative  Respiratory:        HPI   Cardiovascular: Negative for chest pain  Gastrointestinal: Negative  Endocrine: Negative  Genitourinary: Negative  Musculoskeletal: Negative  Allergic/Immunologic: Positive for environmental allergies  Neurological: Negative  Hematological: Negative      Psychiatric/Behavioral:        HPI       Current Outpatient Medications on File Prior to Visit   Medication Sig   • atoMOXetine (STRATTERA) 60 mg capsule Take 1 capsule (60 mg total) by mouth daily   • atoMOXetine (STRATTERA) 60 mg capsule Take 1 capsule (60 mg total) by mouth daily   • azelastine (ASTELIN) 0 1 % nasal spray 2 sprays into each nostril 2 (two) times a day Use in each nostril as directed   • buPROPion (WELLBUTRIN XL) 300 mg 24 hr tablet Take 1 tablet (300 mg total) by mouth daily   • buPROPion (WELLBUTRIN XL) 300 mg 24 hr tablet Take 1 tablet (300 mg total) by mouth daily   • carBAMazepine (TEGretol XR) 200 mg 12 hr tablet Take 1 tablet (200 mg total) by mouth 2 (two) times a day   • hydrOXYzine pamoate (VISTARIL) 50 mg capsule Take 1 capsule (50 mg total) by mouth daily at bedtime as needed for anxiety (sleep difficulties)   • [DISCONTINUED] desvenlafaxine succinate (PRISTIQ) 50 mg 24 hr tablet Take 1 tablet (50 mg total) by mouth daily   • [DISCONTINUED] FLUoxetine (PROzac) 40 MG capsule Take 1 capsule (40 mg total) by mouth daily       Objective     /80 (BP Location: Left arm, Patient Position: Sitting, Cuff Size: Large)   Pulse 94   Temp 98 6 °F (37 °C) (Tympanic)   Resp 18   Ht 6' 7" (2 007 m)   Wt 132 kg (292 lb)   SpO2 100%   BMI 32 90 kg/m²     Physical Exam  Vitals and nursing note reviewed  Constitutional:       Appearance: Normal appearance  He is obese  HENT:      Head: Normocephalic and atraumatic  Ears:      Comments: Both tympanic membranes dull with fluid no injection     Nose:      Comments: Positive allergic turbinate positive pansinus tenderness to percussion     Mouth/Throat:      Comments: Purulent postnasal drip minimal pharyngeal injection negative exudate  Eyes:      General: No scleral icterus  Cardiovascular:      Rate and Rhythm: Normal rate and regular rhythm  Heart sounds: Normal heart sounds  Pulmonary:      Effort: Pulmonary effort is normal       Breath sounds: Normal breath sounds  Abdominal:      General: Bowel sounds are normal       Palpations: Abdomen is soft        Tenderness: There is no abdominal tenderness  Musculoskeletal:      Cervical back: Neck supple  No rigidity  Right lower leg: No edema  Left lower leg: No edema  Lymphadenopathy:      Cervical: Cervical adenopathy present  Skin:     General: Skin is warm and dry  Neurological:      General: No focal deficit present  Mental Status: He is alert     Psychiatric:         Mood and Affect: Mood normal        Tico Thompson DO

## 2023-03-14 NOTE — PATIENT INSTRUCTIONS
Recommend isolation till report of COVID testing is known  Finish doxycycline twice a day for 10 days  Use Astelin nasal spray 2 sprays both nostrils twice daily for the next month  Take prednisone 10 mg 1 daily for 1 week  Return in 1 week if still with symptoms  Start vitamin D 50,000 units once a week  Repeat blood work in 6 weeks for thyroid and vitamin D  Recheck 2 months sooner if needed  Return in 1 week if still with symptoms

## 2023-03-15 ENCOUNTER — TELEPHONE (OUTPATIENT)
Dept: HEMATOLOGY ONCOLOGY | Facility: CLINIC | Age: 23
End: 2023-03-15

## 2023-03-15 LAB
FLUAV RNA RESP QL NAA+PROBE: NEGATIVE
FLUBV RNA RESP QL NAA+PROBE: NEGATIVE
SARS-COV-2 RNA RESP QL NAA+PROBE: NEGATIVE

## 2023-03-15 NOTE — TELEPHONE ENCOUNTER
Patient has a referral on file - Made an attempt to schedule a new patient consultation  I spoke with patient who explained that he will call back at a better time to schedule

## 2023-03-16 ENCOUNTER — TELEPHONE (OUTPATIENT)
Dept: FAMILY MEDICINE CLINIC | Facility: CLINIC | Age: 23
End: 2023-03-16

## 2023-03-22 ENCOUNTER — DOCUMENTATION (OUTPATIENT)
Dept: BEHAVIORAL/MENTAL HEALTH CLINIC | Facility: CLINIC | Age: 23
End: 2023-03-22

## 2023-03-22 ENCOUNTER — TELEPHONE (OUTPATIENT)
Dept: ENDOCRINOLOGY | Facility: CLINIC | Age: 23
End: 2023-03-22

## 2023-03-22 NOTE — PROGRESS NOTES
Client will be removed from psychological testing  Client cancelled intake appointment and has yet to reschedule the appointment

## 2023-04-03 ENCOUNTER — CONSULT (OUTPATIENT)
Dept: HEMATOLOGY ONCOLOGY | Facility: CLINIC | Age: 23
End: 2023-04-03

## 2023-04-03 VITALS
TEMPERATURE: 98.3 F | RESPIRATION RATE: 18 BRPM | OXYGEN SATURATION: 99 % | HEART RATE: 109 BPM | WEIGHT: 287 LBS | BODY MASS INDEX: 38.04 KG/M2 | SYSTOLIC BLOOD PRESSURE: 118 MMHG | HEIGHT: 73 IN | DIASTOLIC BLOOD PRESSURE: 74 MMHG

## 2023-04-03 DIAGNOSIS — E55.9 VITAMIN D DEFICIENCY: ICD-10-CM

## 2023-04-03 DIAGNOSIS — D80.3 SELECTIVE DEFICIENCY OF IGG SUBCLASSES (HCC): ICD-10-CM

## 2023-04-03 RX ORDER — ERGOCALCIFEROL 1.25 MG/1
CAPSULE ORAL
Qty: 12 CAPSULE | Refills: 2 | Status: SHIPPED | OUTPATIENT
Start: 2023-04-03

## 2023-04-03 NOTE — PROGRESS NOTES
[address] [id number]  Hematology Ambulatory Consult  Key Giraldo, 2000, [id number]  4/3/2023    Assessment/Plan:  1  Selective deficiency of IgG subclasses [facility])  Mr Ofelia Carmen is a 29-year-old male seen in consultation for IgG 1 and 2 deficiency  I discussed with the patient and his mother that this is not a primary hematology problem and he needs further evaluation with immunology  I explained that this is common in adolescence and not concerning as he does not have recurrent infections  He has required antibiotic every few years due to upper respiratory infection or a toe infection but otherwise does not require double dose of antibiotics or been hospitalized needing IV antibiotics  We also discussed that he has an elevation in IgG 4 which is sometimes contributed to an autoimmune condition  His EDWAR is negative and he is also asymptomatic with this  I explained that he should continue to follow with his PCP regularly to discuss his symptoms and if further evaluation is needed he would need to be evaluated with rheumatology  I explained that due to his normal CBC and CMP there is no ongoing need for hematology follow-up  A referral was placed to immunology/allergy to be scheduled  Information provided to his mother     - Ambulatory Referral to Hematology / Oncology    Patient voiced agreement and understanding to the above  Patient knows to call the Hematology/Oncology office with any questions and concerns regarding the above  Barrier(s) to care: None    The patient is able to self care     -------------------------------------------------------------------------------------------------------    Chief Complaint   Patient presents with   • Consult       Referring provider:  Kristan Corrales DO  [address]  [address] [place] [id number]    History of present illness:  Key Giraldo is a 61-year-old male with past medical history of seasonal allergies, autism, bipolar 1, ADHD, and obesity  He is seen in consultation for IgG subclass deficiency  He underwent IgG subclass testing by his psychiatrist in [place] for persistent fatigue  He was found to have a deficiency in IgG1 of 349, IgG2 194  IgG 3 is within normal limits  IgG4 was slightly elevated at 102  CBC and CMP are within normal limits  EDWAR was negative  He does have a chronic infection in his foot and has been followed by podiatry  He has not required antibiotics for this in a while  As a child he did have recurrent ear infections requiring tubes placed at an early age  Last antibiotic was in January for an upper respiratory infection  There were no antibiotics noted for at least 2 years prior to this  He has no symptoms or complaints or concerns today  He denies any abdominal pain or diarrhea  He denies any tooth infections  He does have seasonal allergies and that is all of his allergies that he is aware of  He has never undergone testing for this  He does state at times he does have excess saliva but denies trouble swallowing or change in taste  For further work-up as for his fatigue his thyroid and vitamin D were tested and he was found to be vitamin D deficient as well as a low T4  He is scheduled for evaluation with endocrinology in June  And he was also placed on a vitamin D supplement  Review of Systems   Constitutional: Positive for fatigue  Negative for activity change, appetite change, diaphoresis, fever and unexpected weight change  HENT: Negative for trouble swallowing and voice change  Eyes: Negative for photophobia and visual disturbance  Respiratory: Negative for cough, chest tightness and shortness of breath  Cardiovascular: Negative for chest pain, palpitations and leg swelling     Gastrointestinal: Negative for abdominal distention, abdominal pain, anal bleeding, blood in stool, constipation, diarrhea, nausea and vomiting  Endocrine: Negative for cold intolerance and heat intolerance  Genitourinary: Negative for dysuria, hematuria and urgency  Musculoskeletal: Negative for arthralgias, back pain, gait problem and joint swelling  Skin: Negative for pallor and rash  Neurological: Negative for dizziness, weakness, light-headedness, numbness and headaches  Hematological: Negative for adenopathy  Does not bruise/bleed easily  Psychiatric/Behavioral: Negative for confusion and sleep disturbance  Patient Active Problem List   Diagnosis   • Bipolar 1 disorder, depressed, moderate ([facility])   • Attention deficit hyperactivity disorder, inattentive type   • Severe obesity (BMI 35 0-39  9) with comorbidity ([place] [address] )   • Hypertriglyceridemia   • Allergic rhinitis   • Eustachian tube dysfunction   • Autism spectrum disorder   • Low serum T4 level   • Selective deficiency of IgG subclasses ([facility])   • Vitamin D deficiency       Past Medical History:   Diagnosis Date   • ADHD (attention deficit hyperactivity disorder)    • Bipolar disorder ([place] [address] )    • Bipolar disorder ([place] [address] ) 2014       Past Surgical History:   Procedure Laterality Date   • EYE SURGERY  2006    Left eye   • MYRINGOTOMY W/ TUBES Bilateral 2005   • TYMPANOSTOMY TUBE PLACEMENT         Family History   Problem Relation Age of Onset   • Depression Mother    • Bipolar disorder Father    • Suicide Attempts Father    • Anxiety disorder Father    • Alcohol abuse Maternal Grandfather    • Alcohol abuse Paternal Grandfather    • Multiple myeloma Maternal Grandmother    • No Known Problems Paternal Grandmother        Social History     Socioeconomic History   • Marital status: Single     Spouse name: Not on file   • Number of children: Not on file   • Years of education: Not on file   • Highest education level: High school graduate   Occupational History   • Occupation: Student   Tobacco Use   • Smoking status: Never   • Smokeless tobacco: "Never   Vaping Use   • Vaping Use: Never used   Substance and Sexual Activity   • Alcohol use: Never   • Drug use: Never   • Sexual activity: Never   Other Topics Concern   • Not on file   Social History Narrative   • Not on file     Social Determinants of Health     Financial Resource Strain: Not on file   Food Insecurity: Not on file   Transportation Needs: Not on file   Physical Activity: Not on file   Stress: Not on file   Social Connections: Not on file   Intimate Partner Violence: Not on file   Housing Stability: Not on file         Current Outpatient Medications:   •  atoMOXetine (STRATTERA) 60 mg capsule, Take 1 capsule (60 mg total) by mouth daily, Disp: 90 capsule, Rfl: 0  •  azelastine (ASTELIN) 0 1 % nasal spray, 2 sprays into each nostril 2 (two) times a day Use in each nostril as directed, Disp: 30 mL, Rfl: 3  •  buPROPion (WELLBUTRIN XL) 300 mg 24 hr tablet, Take 1 tablet (300 mg total) by mouth daily, Disp: 90 tablet, Rfl: 0  •  buPROPion (WELLBUTRIN XL) 300 mg 24 hr tablet, Take 1 tablet (300 mg total) by mouth daily, Disp: 90 tablet, Rfl: 0  •  carBAMazepine (TEGretol XR) 200 mg 12 hr tablet, Take 1 tablet (200 mg total) by mouth 2 (two) times a day, Disp: 180 tablet, Rfl: 0  •  ergocalciferol (VITAMIN D2) 50,000 units, TAKE 1 CAPSULE BY MOUTH ONE TIME PER WEEK, Disp: 12 capsule, Rfl: 2  •  hydrOXYzine pamoate (VISTARIL) 50 mg capsule, Take 1 capsule (50 mg total) by mouth daily at bedtime as needed for anxiety (sleep difficulties), Disp: 90 capsule, Rfl: 0  •  atoMOXetine (STRATTERA) 60 mg capsule, Take 1 capsule (60 mg total) by mouth daily, Disp: 90 capsule, Rfl: 0    Allergies   Allergen Reactions   • Azithromycin Other (See Comments) and Rash   • Penicillins Rash       Objective:  /74 (BP Location: Left arm)   Pulse (!) 109   Temp 98 3 °F (36 8 °C) (Tympanic)   Resp 18   Ht 6' 1\" (1 854 m)   Wt 130 kg (287 lb)   SpO2 99%   BMI 37 87 kg/m²   Physical Exam  Constitutional:       " General: He is not in acute distress  Appearance: Normal appearance  He is obese  He is not ill-appearing  HENT:      Head: Normocephalic and atraumatic  Eyes:      Extraocular Movements: Extraocular movements intact  Conjunctiva/sclera: Conjunctivae normal       Pupils: Pupils are equal, round, and reactive to light  Cardiovascular:      Rate and Rhythm: Normal rate and regular rhythm  Pulses: Normal pulses  Heart sounds: Normal heart sounds  No murmur heard  Pulmonary:      Effort: Pulmonary effort is normal  No respiratory distress  Breath sounds: Normal breath sounds  No stridor  Abdominal:      General: Abdomen is flat  Bowel sounds are normal  There is no distension  Palpations: Abdomen is soft  Tenderness: There is no abdominal tenderness  Musculoskeletal:         General: Normal range of motion  Cervical back: Normal range of motion  No tenderness  Right lower leg: No edema  Left lower leg: No edema  Lymphadenopathy:      Cervical: No cervical adenopathy  Skin:     General: Skin is warm and dry  Capillary Refill: Capillary refill takes less than 2 seconds  Coloration: Skin is not jaundiced or pale  Neurological:      General: No focal deficit present  Mental Status: He is alert and oriented to person, place, and time  Mental status is at baseline  Cranial Nerves: No cranial nerve deficit  Motor: No weakness  Psychiatric:         Mood and Affect: Mood normal          Behavior: Behavior normal          Thought Content:  Thought content normal          Judgment: Judgment normal          Result Review  Labs:  Labs reviewed in care everywhere   Office Visit on 03/14/2023   Component Date Value Ref Range Status   • SARS-CoV-2 03/14/2023 Negative  Negative Final   • INFLUENZA A PCR 03/14/2023 Negative  Negative Final   • INFLUENZA B PCR 03/14/2023 Negative  Negative Final       Imaging:  No relevant imaging to review Please note: This report has been generated by a voice recognition software system  Therefore there may be syntax, spelling, and/or grammatical errors  Please call if you have any questions  no

## 2023-04-05 ENCOUNTER — OFFICE VISIT (OUTPATIENT)
Dept: PODIATRY | Facility: CLINIC | Age: 23
End: 2023-04-05

## 2023-04-05 VITALS
BODY MASS INDEX: 37.91 KG/M2 | DIASTOLIC BLOOD PRESSURE: 74 MMHG | HEIGHT: 73 IN | SYSTOLIC BLOOD PRESSURE: 127 MMHG | HEART RATE: 87 BPM | WEIGHT: 286 LBS

## 2023-04-05 DIAGNOSIS — L60.0 INGROWN TOENAIL: ICD-10-CM

## 2023-04-05 DIAGNOSIS — L03.032 CELLULITIS AND ABSCESS OF TOE, LEFT: Primary | ICD-10-CM

## 2023-04-05 DIAGNOSIS — L02.612 CELLULITIS AND ABSCESS OF TOE, LEFT: Primary | ICD-10-CM

## 2023-04-05 RX ORDER — SULFAMETHOXAZOLE AND TRIMETHOPRIM 800; 160 MG/1; MG/1
1 TABLET ORAL EVERY 12 HOURS SCHEDULED
Qty: 14 TABLET | Refills: 0 | Status: SHIPPED | OUTPATIENT
Start: 2023-04-05 | End: 2023-04-12

## 2023-04-05 NOTE — PROGRESS NOTES
"      PATIENT:  Gilberto Aguilar    2000    ASSESSMENT:     1  Cellulitis and abscess of toe, left  sulfamethoxazole-trimethoprim (BACTRIM DS) 800-160 mg per tablet      2  Ingrown toenail  Nail removal            PLAN:  1  Reviewed the notes from Dr Kiana Desouza  Patient was counseled and educated on the condition and the diagnosis  2  The diagnosis, treatment options and prognosis were discussed with the patient  3  Discussed options with the patient  The patient wished to proceed with partial nail avulsion  See procedure  4  Home care instructions were given to the patient including decreased activity, ice and home pain medication as needed for 3 days  Patient will also perform daily dressing changes until the surgical site is fully healed  5  Rx: Bactrim DS  6  Discussed possible need for permanent nail procedure  7  Patient will return in 2 weeks for re-evaluation  Imaging: I have personally reviewed pertinent films in PACS  Labs, pathology, and Other Studies: I have personally reviewed pertinent reports  Nail removal    Date/Time: 4/5/2023 10:11 AM  Performed by: Franko Coleman DPM  Authorized by: Franko Coleman DPM     Patient location:  ClinicUniversal Protocol:  Consent: Verbal consent obtained  Risks and benefits: risks, benefits and alternatives were discussed  Consent given by: patient  Time out: Immediately prior to procedure a \"time out\" was called to verify the correct patient, procedure, equipment, support staff and site/side marked as required    Timeout called at: 4/5/2023 10:11 AM   Patient understanding: patient states understanding of the procedure being performed  Site marked: the operative site was marked  Patient identity confirmed: verbally with patient      Location:     Foot:  L big toe  Pre-procedure details:     Skin preparation:  Betadine    Preparation: Patient was prepped and draped in the usual sterile fashion    Anesthesia (see MAR for exact dosages):    " Anesthesia method:  Local infiltration    Local anesthetic:  Lidocaine 1% w/o epi  Nail Removal:     Nail removed:  Partial    Nail side:  Lateral  Post-procedure details:     Dressing:  4x4 sterile gauze, antibiotic ointment and gauze roll    Patient tolerance of procedure: Tolerated well, no immediate complications          Subjective:          HPI  The patients presents with chief complaint of infection in left great toe  He related this to ingrown nail  He had it for a month  Increased redness and pain in the last couple of weeks  The following portions of the patient's history were reviewed and updated as appropriate: allergies, current medications, past family history, past medical history, past social history, past surgical history and problem list   All pertinent labs and images were reviewed      Past Medical History  Past Medical History:   Diagnosis Date   • ADHD (attention deficit hyperactivity disorder)    • Bipolar disorder ([facility] )    • Bipolar disorder ([facility] ) 2014       Past Surgical History  Past Surgical History:   Procedure Laterality Date   • EYE SURGERY  2006    Left eye   • MYRINGOTOMY W/ TUBES Bilateral 2005   • TYMPANOSTOMY TUBE PLACEMENT          Allergies:  Azithromycin and Penicillins    Medications:  Current Outpatient Medications   Medication Sig Dispense Refill   • atoMOXetine (STRATTERA) 60 mg capsule Take 1 capsule (60 mg total) by mouth daily 90 capsule 0   • azelastine (ASTELIN) 0 1 % nasal spray 2 sprays into each nostril 2 (two) times a day Use in each nostril as directed 30 mL 3   • buPROPion (WELLBUTRIN XL) 300 mg 24 hr tablet Take 1 tablet (300 mg total) by mouth daily 90 tablet 0   • carBAMazepine (TEGretol XR) 200 mg 12 hr tablet Take 1 tablet (200 mg total) by mouth 2 (two) times a day 180 tablet 0   • ergocalciferol (VITAMIN D2) 50,000 units TAKE 1 CAPSULE BY MOUTH ONE TIME PER WEEK 12 capsule 2   • hydrOXYzine pamoate (VISTARIL) 50 mg capsule Take 1 capsule (50 mg "total) by mouth daily at bedtime as needed for anxiety (sleep difficulties) 90 capsule 0   • sulfamethoxazole-trimethoprim (BACTRIM DS) 800-160 mg per tablet Take 1 tablet by mouth every 12 (twelve) hours for 7 days 14 tablet 0   • atoMOXetine (STRATTERA) 60 mg capsule Take 1 capsule (60 mg total) by mouth daily 90 capsule 0   • buPROPion (WELLBUTRIN XL) 300 mg 24 hr tablet Take 1 tablet (300 mg total) by mouth daily 90 tablet 0     No current facility-administered medications for this visit  Social History:  Social History     Socioeconomic History   • Marital status: Single     Spouse name: None   • Number of children: None   • Years of education: None   • Highest education level: High school graduate   Occupational History   • Occupation: Student   Tobacco Use   • Smoking status: Never   • Smokeless tobacco: Never   Vaping Use   • Vaping Use: Never used   Substance and Sexual Activity   • Alcohol use: Never   • Drug use: Never   • Sexual activity: Never   Other Topics Concern   • None   Social History Narrative   • None     Social Determinants of Health     Financial Resource Strain: Not on file   Food Insecurity: Not on file   Transportation Needs: Not on file   Physical Activity: Not on file   Stress: Not on file   Social Connections: Not on file   Intimate Partner Violence: Not on file   Housing Stability: Not on file        Review of Systems   Constitutional: Negative for chills and fever  Respiratory: Negative for cough and shortness of breath  Cardiovascular: Negative for chest pain  Gastrointestinal: Negative for nausea and vomiting  Musculoskeletal: Negative for gait problem  Objective:      /74 (BP Location: Left arm, Patient Position: Sitting, Cuff Size: Standard)   Pulse 87   Ht 6' 1\" (1 854 m)   Wt 130 kg (286 lb)   BMI 37 73 kg/m²          Physical Exam  Vitals reviewed  Constitutional:       General: He is not in acute distress  Appearance: He is obese   He is " not ill-appearing  Cardiovascular:      Rate and Rhythm: Normal rate and regular rhythm  Pulses: Normal pulses  Dorsalis pedis pulses are 2+ on the right side and 2+ on the left side  Posterior tibial pulses are 2+ on the right side and 2+ on the left side  Pulmonary:      Effort: Pulmonary effort is normal  No respiratory distress  Musculoskeletal:         General: No signs of injury  Right lower leg: No edema  Left lower leg: No edema  Right foot: No foot drop  Left foot: No foot drop  Skin:     General: Skin is warm  Capillary Refill: Capillary refill takes less than 2 seconds  Coloration: Skin is not cyanotic or mottled  Findings: No laceration  Nails: There is no clubbing  Comments: Incurvated lateral nail border left great toe with swelling, redness, and granuloma  Neurological:      General: No focal deficit present  Mental Status: He is alert and oriented to person, place, and time  Cranial Nerves: No cranial nerve deficit  Sensory: No sensory deficit  Motor: No weakness  Coordination: Coordination normal    Psychiatric:         Mood and Affect: Mood normal          Behavior: Behavior normal          Thought Content:  Thought content normal          Judgment: Judgment normal

## 2023-05-01 ENCOUNTER — CONSULT (OUTPATIENT)
Dept: ENDOCRINOLOGY | Facility: HOSPITAL | Age: 23
End: 2023-05-01

## 2023-05-01 VITALS
SYSTOLIC BLOOD PRESSURE: 130 MMHG | HEIGHT: 73 IN | DIASTOLIC BLOOD PRESSURE: 80 MMHG | WEIGHT: 288.6 LBS | BODY MASS INDEX: 38.25 KG/M2 | HEART RATE: 103 BPM

## 2023-05-01 DIAGNOSIS — E55.9 VITAMIN D DEFICIENCY: ICD-10-CM

## 2023-05-01 DIAGNOSIS — T73.2XXA FATIGUE DUE TO EXPOSURE, INITIAL ENCOUNTER: ICD-10-CM

## 2023-05-01 DIAGNOSIS — R79.89 LOW SERUM T4 LEVEL: Primary | ICD-10-CM

## 2023-05-01 NOTE — PROGRESS NOTES
Chief Complaint   Patient presents with   Ag Desir Thyroid Problem      Referring Provider  Gabriella Jimenez, Do  [address]  [address] [facility],  [address] [facility] [id number]     History of Present Illness:   Eulogio Rush is a 23 y o  male with Autism, ADHD, Bipolar Dx, VitD def, class 2 obesity who was referred to us for abnormal low Free T4 of 0 5 with normal TSH 1 17 with neg TPO Ab checked 03/23 when patient was seen by his child psychiatrist and complained of fatigue  Patient presents with mother who also added to some of his symptoms, reports feeling tired  Having issues with lossing weight- gained 40lbs since covid but did lose some weight since last few weeks  Does report some constipation, hair loss, dry skin  Denies brittle nails, dysphagia, odynophagia, headache, blurry vision  Denies any ED, Low libido, loss of muscle mass  He does report not exercing or eating healthy- eats junk food diet  Social Hx- does not smoke, no alcohol use, no other drugs, used to work in [employer] currently not working  Family Hx- aunt had thyroid issues    Patient Active Problem List   Diagnosis    Bipolar 1 disorder, depressed, moderate ([address] )    Attention deficit hyperactivity disorder, inattentive type    Severe obesity (BMI 35 0-39  9) with comorbidity ([address] )    Hypertriglyceridemia    Allergic rhinitis    Eustachian tube dysfunction    Autism spectrum disorder    Low serum T4 level    Selective deficiency of IgG subclasses (HCC)    Vitamin D deficiency      Past Medical History:   Diagnosis Date    ADHD (attention deficit hyperactivity disorder)     Bipolar disorder ([address] )     Bipolar disorder ([address] ) 2014      Past Surgical History:   Procedure Laterality Date    EYE SURGERY  2006    Left eye    MYRINGOTOMY W/ TUBES Bilateral 2005    TYMPANOSTOMY TUBE PLACEMENT        Family History   Problem Relation Age of Onset    Depression Mother     Bipolar disorder Father     Suicide Attempts Father    Ag Desir Anxiety disorder Father     Alcohol abuse Maternal Grandfather     Alcohol abuse Paternal Grandfather     Multiple myeloma Maternal Grandmother     No Known Problems Paternal Grandmother      Social History     Tobacco Use    Smoking status: Never    Smokeless tobacco: Never   Substance Use Topics    Alcohol use: Never     Allergies   Allergen Reactions    Azithromycin Other (See Comments) and Rash    Penicillins Rash         Current Outpatient Medications:     atoMOXetine (STRATTERA) 60 mg capsule, Take 1 capsule (60 mg total) by mouth daily, Disp: 90 capsule, Rfl: 0    atoMOXetine (STRATTERA) 60 mg capsule, Take 1 capsule (60 mg total) by mouth daily, Disp: 90 capsule, Rfl: 0    azelastine (ASTELIN) 0 1 % nasal spray, 2 sprays into each nostril 2 (two) times a day Use in each nostril as directed, Disp: 30 mL, Rfl: 3    buPROPion (WELLBUTRIN XL) 300 mg 24 hr tablet, Take 1 tablet (300 mg total) by mouth daily, Disp: 90 tablet, Rfl: 0    buPROPion (WELLBUTRIN XL) 300 mg 24 hr tablet, Take 1 tablet (300 mg total) by mouth daily, Disp: 90 tablet, Rfl: 0    carBAMazepine (TEGretol XR) 200 mg 12 hr tablet, Take 1 tablet (200 mg total) by mouth 2 (two) times a day, Disp: 180 tablet, Rfl: 0    ergocalciferol (VITAMIN D2) 50,000 units, TAKE 1 CAPSULE BY MOUTH ONE TIME PER WEEK, Disp: 12 capsule, Rfl: 2    hydrOXYzine pamoate (VISTARIL) 50 mg capsule, Take 1 capsule (50 mg total) by mouth daily at bedtime as needed for anxiety (sleep difficulties), Disp: 90 capsule, Rfl: 0  Review of Systems   Constitutional: Positive for fatigue and unexpected weight change  HENT: Negative for trouble swallowing and voice change  Eyes: Negative for visual disturbance  Respiratory: Negative for choking, chest tightness and shortness of breath  Cardiovascular: Negative for chest pain and palpitations  Gastrointestinal: Negative for constipation and diarrhea     Endocrine: Negative for cold intolerance and heat intolerance  Musculoskeletal: Negative for arthralgias and joint swelling  Neurological: Negative for light-headedness and headaches  Physical Exam:  There is no height or weight on file to calculate BMI  There were no vitals taken for this visit  Wt Readings from Last 3 Encounters:   04/19/23 130 kg (286 lb)   04/05/23 130 kg (286 lb)   04/03/23 130 kg (287 lb)       GEN: NAD  E/n/m nl facies, hearing intact bilat, tongue midline, lips nl  Eyes: no stare or proptosis, nl lids and conjunctiva, EOMI  Neck: trachea midline, thyroid NT to palpation, nl in size, no nodules or neck masses noted, no cervical LAD  CV; heart reg rate s1s2 nl, no m/r/g appreciated, no PRATEEK  Resp: CTAB, good effort  Ab+BS  Neuro: no tremor, 2+ DTRs in BUE  MS: no c/c in digits, moves all 4 ext, nl muscle bulk, gait nl  Skin: limited hair growth on hands/legs, Limited facial hair  Psych: nl mood and affect, no gross lapses in memory    DATA:  Labs:      Latest Reference Range & Units 03/06/21 11:36 01/25/23 11:46   TSH 3RD GENERATON 0 450 - 4 500 uIU/mL  0 797   TSH Baseline uIU/mL 1 630    TSH 30 Minutes uIU/ML 1 570      Component 03/08/2023 03/08/2023        Thyroid Stimulating Hormone 1 17 --   T4, Free -- 0 50 Low         Ref Range & Units 3/8/23  3:48 PM    TPO AutoAb <9 IU/mL <1    Thyroglobulin AutoAb <4 IU/mL <1    Comment:        Ref Range & Units 3/8/23  3:48 PM   Vitamin D, 25-OH 30 - 100 ng/mL 16 Low     Comment: Interpretive information: Total Vitamin D, 25-Hydroxy        Radiology    Impression:  1  Low serum T4 level    2  Vitamin D deficiency         Plan:    Benita Mcdonald was seen today for thyroid problem  Diagnoses and all orders for this visit:    Low serum T4 level  -     TSH, 3rd generation; Future  -     T3 Lab Collect; Future  -     T4, Free, Direct Dialysis and T4, Total; Future    Vitamin D deficiency  -     Vitamin D 25 hydroxy Lab Collect;  Future      Patient is a 20yM with PMhx of ADHD, Bipolar Dx, Autism who was seen today d/t low Free t4 x 1 with normal TSH and neg TPO Ab  1) Low Free t4:- Clinically patient having symptoms of weight gain, fatigue, hair loss, dry skin  Discussed low Free t4 could be d/t either lab error vs ?secondary hypothyroidism given inappropriately normal TSH  Discussed being on anti-seizure medications would also cause lab errors and lead to low Free t4 at times  Therefore recommend repeat full workup with TSH, Free t4, Total t4, dialysis T4 and T3 to see if truly has thyroid issues or not  If labs normal on repeat no thyroid issues seen  If abnormal, will start on replacement and do further workup based on concern for primary vs secondary  Given Neg TPO Ab- low risk for primary hypothyroidism     2) VitD def:- patient found to have low VitD 18 on routine screening, started on [id number] weekly by PCP  Repeat VitD in 2 months for follow up     3) Fatigue:- Given lack of body hair and symptoms of fatigue will also screen for hypogonadism  Although denies any low libido and ED, given his autism unsure if truly honest with symptom reported  Therefore will proactively screen    RTC in 1 month to review labs and see if tx needed    Discussed with the patient and all questioned fully answered  He will call me if any problems arise          Jeremy Guan MD

## 2023-05-04 ENCOUNTER — APPOINTMENT (OUTPATIENT)
Dept: LAB | Facility: CLINIC | Age: 23
End: 2023-05-04

## 2023-05-04 DIAGNOSIS — J01.90 ACUTE NON-RECURRENT SINUSITIS, UNSPECIFIED LOCATION: ICD-10-CM

## 2023-05-04 DIAGNOSIS — R63.5 WEIGHT GAIN: ICD-10-CM

## 2023-05-04 DIAGNOSIS — F90.0 ATTENTION DEFICIT HYPERACTIVITY DISORDER, INATTENTIVE TYPE: ICD-10-CM

## 2023-05-04 DIAGNOSIS — E55.9 VITAMIN D DEFICIENCY: ICD-10-CM

## 2023-05-04 DIAGNOSIS — J40 BRONCHITIS: ICD-10-CM

## 2023-05-04 DIAGNOSIS — E55.9 VITAMIN D DEFICIENCY: Primary | ICD-10-CM

## 2023-05-04 DIAGNOSIS — Z00.00 HEALTH CARE MAINTENANCE: ICD-10-CM

## 2023-05-04 DIAGNOSIS — T73.2XXA FATIGUE DUE TO EXPOSURE, INITIAL ENCOUNTER: ICD-10-CM

## 2023-05-04 DIAGNOSIS — Z11.4 SCREENING FOR HIV (HUMAN IMMUNODEFICIENCY VIRUS): ICD-10-CM

## 2023-05-04 DIAGNOSIS — R79.89 LOW SERUM T4 LEVEL: ICD-10-CM

## 2023-05-04 DIAGNOSIS — Z11.59 NEED FOR HEPATITIS C SCREENING TEST: ICD-10-CM

## 2023-05-04 DIAGNOSIS — Z13.220 ENCOUNTER FOR SCREENING FOR LIPID DISORDER: ICD-10-CM

## 2023-05-04 DIAGNOSIS — F31.32 BIPOLAR 1 DISORDER, DEPRESSED, MODERATE (HCC): ICD-10-CM

## 2023-05-04 DIAGNOSIS — H66.007 RECURRENT ACUTE SUPPURATIVE OTITIS MEDIA WITHOUT SPONTANEOUS RUPTURE OF TYMPANIC MEMBRANE, UNSPECIFIED LATERALITY: ICD-10-CM

## 2023-05-04 LAB
25(OH)D3 SERPL-MCNC: 14.3 NG/ML (ref 30–100)
ALBUMIN SERPL BCP-MCNC: 4.1 G/DL (ref 3.5–5)
ALP SERPL-CCNC: 85 U/L (ref 46–116)
ALT SERPL W P-5'-P-CCNC: 78 U/L (ref 12–78)
ANION GAP SERPL CALCULATED.3IONS-SCNC: 3 MMOL/L (ref 4–13)
AST SERPL W P-5'-P-CCNC: 35 U/L (ref 5–45)
BASOPHILS # BLD AUTO: 0.05 THOUSANDS/ÂΜL (ref 0–0.1)
BASOPHILS NFR BLD AUTO: 1 % (ref 0–1)
BILIRUB SERPL-MCNC: 0.3 MG/DL (ref 0.2–1)
BILIRUB UR QL STRIP: NEGATIVE
BUN SERPL-MCNC: 12 MG/DL (ref 5–25)
CALCIUM SERPL-MCNC: 9.7 MG/DL (ref 8.3–10.1)
CHLORIDE SERPL-SCNC: 106 MMOL/L (ref 96–108)
CLARITY UR: CLEAR
CO2 SERPL-SCNC: 27 MMOL/L (ref 21–32)
COLOR UR: NORMAL
CORTIS AM PEAK SERPL-MCNC: 17.2 UG/DL (ref 4.2–22.4)
CREAT SERPL-MCNC: 0.96 MG/DL (ref 0.6–1.3)
EOSINOPHIL # BLD AUTO: 0.14 THOUSAND/ÂΜL (ref 0–0.61)
EOSINOPHIL NFR BLD AUTO: 2 % (ref 0–6)
ERYTHROCYTE [DISTWIDTH] IN BLOOD BY AUTOMATED COUNT: 12.4 % (ref 11.6–15.1)
FERRITIN SERPL-MCNC: 53 NG/ML (ref 8–388)
FSH SERPL-ACNC: 3.4 MIU/ML (ref 0.7–10.8)
GFR SERPL CREATININE-BSD FRML MDRD: 113 ML/MIN/1.73SQ M
GLUCOSE P FAST SERPL-MCNC: 95 MG/DL (ref 65–99)
GLUCOSE UR STRIP-MCNC: NEGATIVE MG/DL
HCT VFR BLD AUTO: 46.1 % (ref 36.5–49.3)
HGB BLD-MCNC: 14.6 G/DL (ref 12–17)
HGB UR QL STRIP.AUTO: NEGATIVE
IGA SERPL-MCNC: 64 MG/DL (ref 70–400)
IGG SERPL-MCNC: 761 MG/DL (ref 700–1600)
IGM SERPL-MCNC: 40 MG/DL (ref 40–230)
IMM GRANULOCYTES # BLD AUTO: 0.02 THOUSAND/UL (ref 0–0.2)
IMM GRANULOCYTES NFR BLD AUTO: 0 % (ref 0–2)
IRON SATN MFR SERPL: 17 % (ref 20–50)
IRON SERPL-MCNC: 65 UG/DL (ref 65–175)
KETONES UR STRIP-MCNC: NEGATIVE MG/DL
LEUKOCYTE ESTERASE UR QL STRIP: NEGATIVE
LH SERPL-ACNC: 3.2 MIU/ML (ref 1.2–10.6)
LYMPHOCYTES # BLD AUTO: 3.12 THOUSANDS/ÂΜL (ref 0.6–4.47)
LYMPHOCYTES NFR BLD AUTO: 36 % (ref 14–44)
MCH RBC QN AUTO: 27.9 PG (ref 26.8–34.3)
MCHC RBC AUTO-ENTMCNC: 31.7 G/DL (ref 31.4–37.4)
MCV RBC AUTO: 88 FL (ref 82–98)
MONOCYTES # BLD AUTO: 0.72 THOUSAND/ÂΜL (ref 0.17–1.22)
MONOCYTES NFR BLD AUTO: 8 % (ref 4–12)
NEUTROPHILS # BLD AUTO: 4.54 THOUSANDS/ÂΜL (ref 1.85–7.62)
NEUTS SEG NFR BLD AUTO: 53 % (ref 43–75)
NITRITE UR QL STRIP: NEGATIVE
NRBC BLD AUTO-RTO: 0 /100 WBCS
PH UR STRIP.AUTO: 6.5 [PH]
PLATELET # BLD AUTO: 331 THOUSANDS/UL (ref 149–390)
PMV BLD AUTO: 10.7 FL (ref 8.9–12.7)
POTASSIUM SERPL-SCNC: 3.9 MMOL/L (ref 3.5–5.3)
PROT SERPL-MCNC: 7.6 G/DL (ref 6.4–8.4)
PROT UR STRIP-MCNC: NEGATIVE MG/DL
RBC # BLD AUTO: 5.24 MILLION/UL (ref 3.88–5.62)
SODIUM SERPL-SCNC: 136 MMOL/L (ref 135–147)
SP GR UR STRIP.AUTO: 1.02 (ref 1–1.03)
T3 SERPL-MCNC: 1.4 NG/ML (ref 0.6–1.8)
T4 SERPL-MCNC: 8.2 UG/DL (ref 4.7–13.3)
TIBC SERPL-MCNC: 372 UG/DL (ref 250–450)
TSH SERPL DL<=0.05 MIU/L-ACNC: 2.87 UIU/ML (ref 0.45–4.5)
UROBILINOGEN UR STRIP-ACNC: <2 MG/DL
WBC # BLD AUTO: 8.59 THOUSAND/UL (ref 4.31–10.16)

## 2023-05-04 RX ORDER — ERGOCALCIFEROL 1.25 MG/1
50000 CAPSULE ORAL WEEKLY
Qty: 12 CAPSULE | Refills: 1 | Status: SHIPPED | OUTPATIENT
Start: 2023-05-04

## 2023-05-05 LAB
TESTOST FREE SERPL-MCNC: 10.2 PG/ML (ref 9.3–26.5)
TESTOST SERPL-MCNC: 546 NG/DL (ref 264–916)

## 2023-05-06 LAB
C DIPHTHERIAE AB SER IA-ACNC: 1.83 IU/ML
CH50 SERPL-ACNC: >60 U/ML

## 2023-05-09 LAB — C TETANI IGG SER IA-ACNC: 0.6 IU/ML

## 2023-05-11 PROBLEM — J45.909 REACTIVE AIRWAY DISEASE WITHOUT COMPLICATION: Status: ACTIVE | Noted: 2023-05-11

## 2023-05-11 LAB
DEPRECATED S PNEUM14 IGG SER-MCNC: 2.3 UG/ML
DEPRECATED S PNEUM19 IGG SER-MCNC: 3 UG/ML
DEPRECATED S PNEUM23 IGG SER-MCNC: <0.1 UG/ML
DEPRECATED S PNEUM3 IGG SER-MCNC: 0.4 UG/ML
DEPRECATED S PNEUM4 IGG SER-MCNC: <0.1 UG/ML
DEPRECATED S PNEUM8 AB SER-MCNC: 11.6 UG/ML
DEPRECATED S PNEUM9 IGG SER-MCNC: <0.1 UG/ML
FLUBV RNA SPEC QL NAA+PROBE: <0.1 UG/ML
S PNEUM DA 18C IGG SER-MCNC: <0.1 UG/ML
S PNEUM DA 19A IGG SER-MCNC: 0.8 UG/ML
S PNEUM DA 6B IGG SER-MCNC: 0.1 UG/ML
SL AMB PNEUMO AB TYPE 17 (17F): 0.2 UG/ML
SL AMB PNEUMO AB TYPE 20: <0.1 UG/ML
SL AMB PNEUMO AB TYPE 22 (22F): 0.2 UG/ML
SL AMB PNEUMO AB TYPE 2: 0.5 UG/ML
SL AMB PNEUMO AB TYPE 34 (10A): <0.1 UG/ML
SL AMB PNEUMO AB TYPE 43 (11A): <0.1 UG/ML
SL AMB PNEUMO AB TYPE 54 (15B): 0.2 UG/ML
SL AMB PNEUMO AB TYPE 5: <0.1 UG/ML
SL AMB PNEUMO AB TYPE 70 (33F): 0.4 UG/ML
STREP PNEUMO TYPE 1: 0.2 UG/ML
STREP PNEUMO TYPE 51: <0.1 UG/ML
STREP PNEUMO TYPE 68: <0.1 UG/ML

## 2023-05-11 RX ORDER — BUPROPION HYDROCHLORIDE 300 MG/1
TABLET ORAL
COMMUNITY
Start: 2023-05-08

## 2023-05-17 ENCOUNTER — OFFICE VISIT (OUTPATIENT)
Dept: FAMILY MEDICINE CLINIC | Facility: CLINIC | Age: 23
End: 2023-05-17

## 2023-05-17 VITALS
HEIGHT: 73 IN | DIASTOLIC BLOOD PRESSURE: 60 MMHG | WEIGHT: 292 LBS | SYSTOLIC BLOOD PRESSURE: 110 MMHG | HEART RATE: 76 BPM | BODY MASS INDEX: 38.7 KG/M2

## 2023-05-17 DIAGNOSIS — F90.0 ATTENTION DEFICIT HYPERACTIVITY DISORDER, INATTENTIVE TYPE: ICD-10-CM

## 2023-05-17 DIAGNOSIS — R79.89 LOW SERUM T4 LEVEL: ICD-10-CM

## 2023-05-17 DIAGNOSIS — E78.1 HYPERTRIGLYCERIDEMIA: ICD-10-CM

## 2023-05-17 DIAGNOSIS — D50.9 IRON DEFICIENCY ANEMIA, UNSPECIFIED IRON DEFICIENCY ANEMIA TYPE: ICD-10-CM

## 2023-05-17 DIAGNOSIS — F31.32 BIPOLAR 1 DISORDER, DEPRESSED, MODERATE (HCC): ICD-10-CM

## 2023-05-17 DIAGNOSIS — J45.20 MILD INTERMITTENT REACTIVE AIRWAY DISEASE WITHOUT COMPLICATION: Primary | ICD-10-CM

## 2023-05-17 DIAGNOSIS — J30.9 ALLERGIC RHINITIS, UNSPECIFIED SEASONALITY, UNSPECIFIED TRIGGER: ICD-10-CM

## 2023-05-17 DIAGNOSIS — F84.0 AUTISM SPECTRUM DISORDER: ICD-10-CM

## 2023-05-17 DIAGNOSIS — E66.01 SEVERE OBESITY (BMI 35.0-39.9) WITH COMORBIDITY (HCC): ICD-10-CM

## 2023-05-17 DIAGNOSIS — E55.9 VITAMIN D DEFICIENCY: ICD-10-CM

## 2023-05-17 DIAGNOSIS — D80.3 SELECTIVE DEFICIENCY OF IGG SUBCLASSES (HCC): ICD-10-CM

## 2023-05-17 DIAGNOSIS — H69.80 DYSFUNCTION OF EUSTACHIAN TUBE, UNSPECIFIED LATERALITY: ICD-10-CM

## 2023-05-17 LAB — MISCELLANEOUS LAB TEST RESULT: NORMAL

## 2023-05-17 RX ORDER — ATOMOXETINE 25 MG/1
CAPSULE ORAL
COMMUNITY
Start: 2023-05-12

## 2023-05-17 NOTE — ASSESSMENT & PLAN NOTE
CBC, iron, ferritin normal mild decreased TIBC we will monitor I recommend multivitamin with iron daily

## 2023-05-17 NOTE — PROGRESS NOTES
Name: Sherley Hardin      : 2000      MRN: [id number]  Encounter Provider: Gonzalo Hope DO  Encounter Date: 2023   Encounter department: [facility] PRIMARY [facility]    Assessment & Plan     1  Asthma/allergic rhinitis/eustachian tube dysfunction, stable follow with allergist  2  IgG deficiency, following with allergy/immunology  3  Vitamin D deficiency, endocrinology started vitamin D 50,000 units patient did not start as of yet  I recommend starting ASAP  4  BMI 38 52 patient gained 10 pounds, diet exercise weight loss recommended cortisol and TSH were normal  5  Low T4, repeat T3, T4, TSH all normal  5  Hypertriglyceridemia, low-fat low triglyceride diet recommended  6  Bipolar/ADHD, stable following with psychiatry  7  Autism spectrum disorder, following with psychiatry MA 51 completed so patient may have in-home therapy  8  Iron deficiency anemia, iron levels are stable, CBC normal, mild decreased TIBC  I recommend multivitamin iron daily  9  Return in 6 months for office visit and blood work sooner if needed        1  Mild intermittent reactive airway disease without complication  Assessment & Plan:  Stable follows with allergist    Orders:  -     CBC; Future; Expected date: 2023  -     Comprehensive metabolic panel; Future; Expected date: 2023  -     Lipid Panel with Direct LDL reflex; Future; Expected date: 2023  -     TSH, 3rd generation with Free T4 reflex; Future; Expected date: 2023  -     UA (URINE) with reflex to Scope; Future; Expected date: 2023  -     Vitamin D 25 hydroxy; Future; Expected date: 2023  -     Ferritin; Future; Expected date: 2023  -     Iron; Future; Expected date: 2023    2  Allergic rhinitis, unspecified seasonality, unspecified trigger  Assessment & Plan:  As above    Orders:  -     CBC; Future; Expected date: 2023  -     Comprehensive metabolic panel;  Future; Expected date: 2023  - Lipid Panel with Direct LDL reflex; Future; Expected date: 11/17/2023  -     TSH, 3rd generation with Free T4 reflex; Future; Expected date: 11/17/2023  -     UA (URINE) with reflex to Scope; Future; Expected date: 11/17/2023  -     Vitamin D 25 hydroxy; Future; Expected date: 11/17/2023  -     Ferritin; Future; Expected date: 11/17/2023  -     Iron; Future; Expected date: 11/17/2023    3  Dysfunction of Eustachian tube, unspecified laterality  Assessment & Plan:  As above    Orders:  -     CBC; Future; Expected date: 11/17/2023  -     Comprehensive metabolic panel; Future; Expected date: 11/17/2023  -     Lipid Panel with Direct LDL reflex; Future; Expected date: 11/17/2023  -     TSH, 3rd generation with Free T4 reflex; Future; Expected date: 11/17/2023  -     UA (URINE) with reflex to Scope; Future; Expected date: 11/17/2023  -     Vitamin D 25 hydroxy; Future; Expected date: 11/17/2023  -     Ferritin; Future; Expected date: 11/17/2023  -     Iron; Future; Expected date: 11/17/2023    4  Vitamin D deficiency  Assessment & Plan:  Patient has not started his vitamin D yet I recommend starting ASAP mother stated that the medication is not the pharmacy  I explained that I see in the computer that endocrinologist did send it in 5/4/2023  If it is not there they should contact endocrinology's office    Orders:  -     CBC; Future; Expected date: 11/17/2023  -     Comprehensive metabolic panel; Future; Expected date: 11/17/2023  -     Lipid Panel with Direct LDL reflex; Future; Expected date: 11/17/2023  -     TSH, 3rd generation with Free T4 reflex; Future; Expected date: 11/17/2023  -     UA (URINE) with reflex to Scope; Future; Expected date: 11/17/2023  -     Vitamin D 25 hydroxy; Future; Expected date: 11/17/2023  -     Ferritin; Future; Expected date: 11/17/2023  -     Iron; Future; Expected date: 11/17/2023    5  Severe obesity (BMI 35 0-39  9) with comorbidity ([facility] [address] )  Assessment & Plan:  BMI 38 52 patient gained 10 pounds, diet exercise weight loss recommended, cortisol and TSH normal    Orders:  -     CBC; Future; Expected date: 11/17/2023  -     Comprehensive metabolic panel; Future; Expected date: 11/17/2023  -     Lipid Panel with Direct LDL reflex; Future; Expected date: 11/17/2023  -     TSH, 3rd generation with Free T4 reflex; Future; Expected date: 11/17/2023  -     UA (URINE) with reflex to Scope; Future; Expected date: 11/17/2023  -     Vitamin D 25 hydroxy; Future; Expected date: 11/17/2023  -     Ferritin; Future; Expected date: 11/17/2023  -     Iron; Future; Expected date: 11/17/2023    6  Selective deficiency of IgG subclasses ([facility] [address] )  Assessment & Plan:  Patient following with allergy/immunology, Dr Jamil Garcia    Orders:  -     CBC; Future; Expected date: 11/17/2023  -     Comprehensive metabolic panel; Future; Expected date: 11/17/2023  -     Lipid Panel with Direct LDL reflex; Future; Expected date: 11/17/2023  -     TSH, 3rd generation with Free T4 reflex; Future; Expected date: 11/17/2023  -     UA (URINE) with reflex to Scope; Future; Expected date: 11/17/2023  -     Vitamin D 25 hydroxy; Future; Expected date: 11/17/2023  -     Ferritin; Future; Expected date: 11/17/2023  -     Iron; Future; Expected date: 11/17/2023    7  Low serum T4 level  Assessment & Plan:  Endocrinology, T3, T4 TSH, were normal    Orders:  -     CBC; Future; Expected date: 11/17/2023  -     Comprehensive metabolic panel; Future; Expected date: 11/17/2023  -     Lipid Panel with Direct LDL reflex; Future; Expected date: 11/17/2023  -     TSH, 3rd generation with Free T4 reflex; Future; Expected date: 11/17/2023  -     UA (URINE) with reflex to Scope; Future; Expected date: 11/17/2023  -     Vitamin D 25 hydroxy; Future; Expected date: 11/17/2023  -     Ferritin; Future; Expected date: 11/17/2023  -     Iron; Future; Expected date: 11/17/2023    8   Hypertriglyceridemia  Assessment & Plan:  Fat low triglyceride diet recommended    Orders:  -     CBC; Future; Expected date: 11/17/2023  -     Comprehensive metabolic panel; Future; Expected date: 11/17/2023  -     Lipid Panel with Direct LDL reflex; Future; Expected date: 11/17/2023  -     TSH, 3rd generation with Free T4 reflex; Future; Expected date: 11/17/2023  -     UA (URINE) with reflex to Scope; Future; Expected date: 11/17/2023  -     Vitamin D 25 hydroxy; Future; Expected date: 11/17/2023  -     Ferritin; Future; Expected date: 11/17/2023  -     Iron; Future; Expected date: 11/17/2023    9  Bipolar 1 disorder, depressed, moderate (HCC)  Assessment & Plan:  Following with psychiatry    Orders:  -     CBC; Future; Expected date: 11/17/2023  -     Comprehensive metabolic panel; Future; Expected date: 11/17/2023  -     Lipid Panel with Direct LDL reflex; Future; Expected date: 11/17/2023  -     TSH, 3rd generation with Free T4 reflex; Future; Expected date: 11/17/2023  -     UA (URINE) with reflex to Scope; Future; Expected date: 11/17/2023  -     Vitamin D 25 hydroxy; Future; Expected date: 11/17/2023  -     Ferritin; Future; Expected date: 11/17/2023  -     Iron; Future; Expected date: 11/17/2023    10  Autism spectrum disorder  Assessment & Plan:  MA 51 completed for in-home therapy  Following with psychiatry    Orders:  -     CBC; Future; Expected date: 11/17/2023  -     Comprehensive metabolic panel; Future; Expected date: 11/17/2023  -     Lipid Panel with Direct LDL reflex; Future; Expected date: 11/17/2023  -     TSH, 3rd generation with Free T4 reflex; Future; Expected date: 11/17/2023  -     UA (URINE) with reflex to Scope; Future; Expected date: 11/17/2023  -     Vitamin D 25 hydroxy; Future; Expected date: 11/17/2023  -     Ferritin; Future; Expected date: 11/17/2023  -     Iron; Future; Expected date: 11/17/2023    11  Attention deficit hyperactivity disorder, inattentive type  Assessment & Plan:  Stable following with psychiatry    Orders:  -     CBC;  Future; Expected date: 11/17/2023  -     Comprehensive metabolic panel; Future; Expected date: 11/17/2023  -     Lipid Panel with Direct LDL reflex; Future; Expected date: 11/17/2023  -     TSH, 3rd generation with Free T4 reflex; Future; Expected date: 11/17/2023  -     UA (URINE) with reflex to Scope; Future; Expected date: 11/17/2023  -     Vitamin D 25 hydroxy; Future; Expected date: 11/17/2023  -     Ferritin; Future; Expected date: 11/17/2023  -     Iron; Future; Expected date: 11/17/2023    12  Iron deficiency anemia, unspecified iron deficiency anemia type  Assessment & Plan:  CBC, iron, ferritin normal mild decreased TIBC we will monitor I recommend multivitamin with iron daily    Orders:  -     CBC; Future; Expected date: 11/17/2023  -     Comprehensive metabolic panel; Future; Expected date: 11/17/2023  -     Lipid Panel with Direct LDL reflex; Future; Expected date: 11/17/2023  -     TSH, 3rd generation with Free T4 reflex; Future; Expected date: 11/17/2023  -     UA (URINE) with reflex to Scope; Future; Expected date: 11/17/2023  -     Vitamin D 25 hydroxy; Future; Expected date: 11/17/2023  -     Ferritin; Future; Expected date: 11/17/2023  -     Iron; Future; Expected date: 11/17/2023           Subjective      Patient seen for follow-up with his mother  Patient without any medical complaints or concerns  Filled out an MA 51 for in-home therapy for his autism  Blood work was reviewed  Patient seeing endocrinology and immunology at this time    Review of Systems   Constitutional: Negative  HENT: Negative  Eyes: Negative  Respiratory: Negative  Cardiovascular: Negative  Gastrointestinal: Negative  Endocrine: Negative  Genitourinary: Negative  Musculoskeletal: Negative  Skin: Negative  Allergic/Immunologic: Negative  Neurological:        HPI   Hematological: Negative  Psychiatric/Behavioral: Negative          Current Outpatient Medications on File Prior to Visit   Medication "Sig   • atoMOXetine (STRATTERA) 25 mg capsule    • budesonide-formoterol (Symbicort) 160-4 5 mcg/act inhaler Inhale 2 puffs 2 (two) times a day Rinse mouth after use  • buPROPion (WELLBUTRIN XL) 300 mg 24 hr tablet    • carBAMazepine (TEGretol XR) 200 mg 12 hr tablet Take 1 tablet (200 mg total) by mouth 2 (two) times a day   • ergocalciferol (VITAMIN D2) 50,000 units Take 1 capsule (50,000 Units total) by mouth once a week   • hydrOXYzine pamoate (VISTARIL) 50 mg capsule Take 1 capsule (50 mg total) by mouth daily at bedtime as needed for anxiety (sleep difficulties)   • Multiple Vitamins-Iron (MULTIVITAMIN/IRON PO) Take by mouth   • Spacer/Aero-Holding Chambers (Vortex Valved Holding Chamber) MAHESH Use 2 (two) times a day   • [DISCONTINUED] desvenlafaxine succinate (PRISTIQ) 50 mg 24 hr tablet Take 1 tablet (50 mg total) by mouth daily       Objective     /60   Pulse 76   Ht 6' 1\" (1 854 m)   Wt 132 kg (292 lb)   BMI 38 52 kg/m²     Physical Exam  Vitals and nursing note reviewed  Constitutional:       Appearance: Normal appearance  He is obese  HENT:      Head: Normocephalic and atraumatic  Mouth/Throat:      Mouth: Mucous membranes are moist    Eyes:      General: No scleral icterus  Neck:      Vascular: No carotid bruit  Cardiovascular:      Rate and Rhythm: Normal rate and regular rhythm  Heart sounds: Normal heart sounds  Pulmonary:      Effort: Pulmonary effort is normal       Breath sounds: Normal breath sounds  Abdominal:      General: Bowel sounds are normal       Palpations: Abdomen is soft  Tenderness: There is no abdominal tenderness  Musculoskeletal:      Cervical back: Neck supple  Right lower leg: No edema  Left lower leg: No edema  Skin:     General: Skin is warm and dry  Neurological:      General: No focal deficit present  Mental Status: He is alert     Psychiatric:         Mood and Affect: Mood normal        Tico Thompson DO  "

## 2023-05-17 NOTE — PATIENT INSTRUCTIONS
I recommend starting vitamin D as prescribed by endocrinology as soon as possible  I recommend multivitamin iron daily  Diet exercise weight loss recommended  Follow with all specialist further instructions  Return in 6 months for office visit and blood work sooner if needed

## 2023-05-17 NOTE — ASSESSMENT & PLAN NOTE
Patient has not started his vitamin D yet I recommend starting ASAP mother stated that the medication is not the pharmacy  I explained that I see in the computer that endocrinologist did send it in 5/4/2023    If it is not there they should contact endocrinology's office

## 2023-05-22 NOTE — RESULT ENCOUNTER NOTE
Pneumococcal titers protective 3/23 seroptypes  IgA is a little low  CMP, CH50, CBC with diff are in normal and acceptable limits    Protective titers to tetanus, diphtheria,

## 2023-05-31 ENCOUNTER — OFFICE VISIT (OUTPATIENT)
Dept: ENDOCRINOLOGY | Facility: HOSPITAL | Age: 23
End: 2023-05-31

## 2023-05-31 VITALS
BODY MASS INDEX: 38.7 KG/M2 | HEART RATE: 88 BPM | DIASTOLIC BLOOD PRESSURE: 78 MMHG | SYSTOLIC BLOOD PRESSURE: 118 MMHG | HEIGHT: 73 IN | WEIGHT: 292 LBS

## 2023-05-31 DIAGNOSIS — D50.9 IRON DEFICIENCY ANEMIA, UNSPECIFIED IRON DEFICIENCY ANEMIA TYPE: ICD-10-CM

## 2023-05-31 DIAGNOSIS — E55.9 VITAMIN D DEFICIENCY: Primary | ICD-10-CM

## 2023-05-31 RX ORDER — ERGOCALCIFEROL 1.25 MG/1
50000 CAPSULE ORAL 2 TIMES WEEKLY
Qty: 50 CAPSULE | Refills: 1 | Status: SHIPPED | OUTPATIENT
Start: 2023-06-01

## 2023-05-31 NOTE — PROGRESS NOTES
Chief Complaint   Patient presents with   • Hypothyroidism        History of Present Illness:   Alda Rose is a 23 y o  male with Autism, ADHD, Bipolar Dx, VitD def, class 2 obesity who was referred to us for abnormal low Free T4 of 0 5 with normal TSH 1 17 with neg TPO Ab checked 03/23 d/t symptoms of fatigue  Initial visit 05/23- given only 1 low free T4 with previous normal levels, recommended repeat lab work before considering tx  Patient is here to review repeat lab work  Most recent TSH 2 870, free T4 1 40, TT4 8 2  Testostesterone 546 T and 10 2 Freem FSH 3 4, LH 3 2, Cortisol 17 2  VitD level still low at 14 3 despite being on vitD 50,000IU weekly  Also found to have low Iron sat on labs- started on MV supplement with iron through PCP  Crescencio Zaldivar still complains of fatigue, weight gain, but hasnt been following much diet/exercise plan  Reports sleeping at 12am and wakes up at 7, has broken sleep at times, sometimes on tv/phone before bed  Reports compliance with vitamins at this time  Social Hx- does not smoke, no alcohol use, no other drugs, used to work in [employer], currently not working  Family Hx- aunt had thyroid issues    Patient Active Problem List   Diagnosis   • Bipolar 1 disorder, depressed, moderate (HCC)   • Attention deficit hyperactivity disorder, inattentive type   • Severe obesity (BMI 35 0-39  9) with comorbidity ([facility] [address] )   • Hypertriglyceridemia   • Allergic rhinitis   • Eustachian tube dysfunction   • Autism spectrum disorder   • Selective deficiency of IgG subclasses (HCC)   • Vitamin D deficiency   • Reactive airway disease without complication   • Iron deficiency anemia      Past Medical History:   Diagnosis Date   • ADHD (attention deficit hyperactivity disorder)    • Bipolar disorder ([address] )    • Bipolar disorder ([facility] [address] ) 2014      Past Surgical History:   Procedure Laterality Date   • EYE SURGERY  2006    Left eye   • MYRINGOTOMY W/ TUBES Bilateral 2005   • TYMPANOSTOMY TUBE PLACEMENT        Family History   Problem Relation Age of Onset   • Depression Mother    • Infertility Mother         PCOD   • Bipolar disorder Father    • Suicide Attempts Father    • Anxiety disorder Father    • Alcohol abuse Maternal Grandfather    • Alcohol abuse Paternal Grandfather    • Multiple myeloma Maternal Grandmother    • Osteoporosis Maternal Grandmother    • No Known Problems Paternal Grandmother      Social History     Tobacco Use   • Smoking status: Never   • Smokeless tobacco: Never   Substance Use Topics   • Alcohol use: Never     Allergies   Allergen Reactions   • Azithromycin Other (See Comments) and Rash   • Penicillins Rash         Current Outpatient Medications:   •  atoMOXetine (STRATTERA) 25 mg capsule, , Disp: , Rfl:   •  budesonide-formoterol (Symbicort) 160-4 5 mcg/act inhaler, Inhale 2 puffs 2 (two) times a day Rinse mouth after use , Disp: 10 2 g, Rfl: 3  •  buPROPion (WELLBUTRIN XL) 300 mg 24 hr tablet, , Disp: , Rfl:   •  carBAMazepine (TEGretol XR) 200 mg 12 hr tablet, Take 1 tablet (200 mg total) by mouth 2 (two) times a day, Disp: 180 tablet, Rfl: 0  •  ergocalciferol (VITAMIN D2) 50,000 units, Take 1 capsule (50,000 Units total) by mouth once a week, Disp: 12 capsule, Rfl: 1  •  hydrOXYzine pamoate (VISTARIL) 50 mg capsule, Take 1 capsule (50 mg total) by mouth daily at bedtime as needed for anxiety (sleep difficulties), Disp: 90 capsule, Rfl: 0  •  Multiple Vitamins-Iron (MULTIVITAMIN/IRON PO), Take by mouth, Disp: , Rfl:   •  Spacer/Aero-Holding Chambers (Vortex Valved Holding Chamber) MAHESH, Use 2 (two) times a day, Disp: 1 each, Rfl: 0  Review of Systems   Constitutional: Positive for fatigue and unexpected weight change  HENT: Negative for trouble swallowing and voice change  Eyes: Negative for visual disturbance  Respiratory: Negative for choking, chest tightness and shortness of breath  Cardiovascular: Negative for chest pain and palpitations  "  Gastrointestinal: Negative for constipation and diarrhea  Endocrine: Negative for cold intolerance and heat intolerance  Musculoskeletal: Negative for arthralgias and joint swelling  Neurological: Negative for light-headedness and headaches  Physical Exam:  Body mass index is 38 52 kg/m²    /78   Pulse 88   Ht 6' 1\" (1 854 m)   Wt 132 kg (292 lb)   BMI 38 52 kg/m²    Wt Readings from Last 3 Encounters:   05/31/23 132 kg (292 lb)   05/22/23 134 kg (295 lb)   05/17/23 132 kg (292 lb)       GEN: NAD  E/n/m nl facies, hearing intact bilat, tongue midline, lips nl  Eyes: no stare or proptosis, nl lids and conjunctiva, EOMI  Neck: trachea midline, thyroid NT to palpation, nl in size, no nodules or neck masses noted, no cervical LAD  CV; heart reg rate s1s2 nl, no m/r/g appreciated, no PRATEEK  Resp: CTAB, good effort  Ab+BS  Neuro: no tremor, 2+ DTRs in BUE  MS: no c/c in digits, moves all 4 ext, nl muscle bulk, gait nl  Skin: limited hair growth on hands/legs, Limited facial hair  Psych: nl mood and affect, no gross lapses in memory    DATA:  Labs:      Latest Reference Range & Units 03/06/21 11:36 01/25/23 11:46   TSH 3RD GENERATON 0 450 - 4 500 uIU/mL  0 797   TSH Baseline uIU/mL 1 630    TSH 30 Minutes uIU/ML 1 570      Component 03/08/2023 03/08/2023        Thyroid Stimulating Hormone 1 17 --   T4, Free -- 0 50 Low         Ref Range & Units 3/8/23  3:48 PM    TPO AutoAb <9 IU/mL <1    Thyroglobulin AutoAb <4 IU/mL <1    Comment:        Ref Range & Units 3/8/23  3:48 PM   Vitamin D, 25-OH 30 - 100 ng/mL 16 Low     Comment: Interpretive information: Total Vitamin D, 25-Hydroxy       Latest Reference Range & Units 05/04/23 08:22   Sodium 135 - 147 mmol/L 136   Potassium 3 5 - 5 3 mmol/L 3 9   Chloride 96 - 108 mmol/L 106   CO2 21 - 32 mmol/L 27   Anion Gap 4 - 13 mmol/L 3 (L)   BUN 5 - 25 mg/dL 12   Creatinine 0 60 - 1 30 mg/dL 0 96   GLUCOSE FASTING 65 - 99 mg/dL 95   Calcium 8 3 - 10 1 mg/dL 9 7 " AST 5 - 45 U/L 35   ALT 12 - 78 U/L 78   Alkaline Phosphatase 46 - 116 U/L 85   Total Protein 6 4 - 8 4 g/dL 7 6   Albumin 3 5 - 5 0 g/dL 4 1   TOTAL BILIRUBIN 0 20 - 1 00 mg/dL 0 30   eGFR ml/min/1 73sq m 113   Iron 65 - 175 ug/dL 65   Ferritin 8 - 388 ng/mL 53   Iron Saturation 20 - 50 % 17 (L)   TIBC 250 - 450 ug/dL 372   Vit D, 25-Hydroxy 30 0 - 100 0 ng/mL 14 3 (L)   (L): Data is abnormally low       Latest Reference Range & Units 05/04/23 08:22   Testosterone, Total, LC/ - 916 ng/dL 546   TESTOSTERONE FREE 9 3 - 26 5 pg/mL 10 2   TSH 3RD GENERATON 0 450 - 4 500 uIU/mL 2 870   T3, Total 0 60 - 1 80 ng/mL 1 40   T4 TOTAL 4 7 - 13 3 ug/dL 8 2     Radiology    Impression:  No diagnosis found  Plan:    There are no diagnoses linked to this encounter  Patient is a 20yM with PMhx of ADHD, Bipolar Dx, Autism who was seen today d/t low Free t4 x 1 with normal TSH and neg TPO Ab  1) Low Free t4:- Most likely lab error given repeat TFT including free t4 normal, and given neg TPO Ab, do not believe he has hypothyroidism  Workup for secondary hormonal causes of fatigue also neg for AI, Hypogonadism  Discussed symptoms of fatigue d t vitD and iron def  2) VitD def:- vitD level still low despite being on 50,000IU weekly, increaase dose to twice a week and repeat labs in 3 months  Can follow PCP for this in future unless theyd like us to follow up    3) Fatigue:- Multifactorial, discussed importance of weight loss- diet/exercise to help with overall help  4) iron def- Managed by PCP    RTC PRN  Discussed with the patient and all questioned fully answered  He will call me if any problems arise        Arline Huertas MD

## 2023-09-21 ENCOUNTER — TELEPHONE (OUTPATIENT)
Dept: FAMILY MEDICINE CLINIC | Facility: CLINIC | Age: 23
End: 2023-09-21

## 2023-09-21 NOTE — TELEPHONE ENCOUNTER
Forms scanned in patients charts and filled out by . Call patient that form is ready, forms are up in front to be picked up.

## 2023-09-21 NOTE — TELEPHONE ENCOUNTER
Patients mother came in asking for forms to be filled for his Physical forms/Austim form and conformation that he had gotten his Meningococcal ACWY vaccination shot.

## 2023-10-04 ENCOUNTER — TELEPHONE (OUTPATIENT)
Dept: FAMILY MEDICINE CLINIC | Facility: CLINIC | Age: 23
End: 2023-10-04

## 2023-10-04 ENCOUNTER — APPOINTMENT (OUTPATIENT)
Dept: LAB | Facility: HOSPITAL | Age: 23
End: 2023-10-04
Payer: COMMERCIAL

## 2023-10-04 ENCOUNTER — HOSPITAL ENCOUNTER (OUTPATIENT)
Dept: CT IMAGING | Facility: HOSPITAL | Age: 23
Discharge: HOME | End: 2023-10-04
Payer: COMMERCIAL

## 2023-10-04 ENCOUNTER — OFFICE VISIT (OUTPATIENT)
Dept: FAMILY MEDICINE CLINIC | Facility: CLINIC | Age: 23
End: 2023-10-04
Payer: COMMERCIAL

## 2023-10-04 VITALS
BODY MASS INDEX: 38.43 KG/M2 | TEMPERATURE: 98 F | OXYGEN SATURATION: 98 % | SYSTOLIC BLOOD PRESSURE: 118 MMHG | HEART RATE: 108 BPM | WEIGHT: 290 LBS | HEIGHT: 73 IN | DIASTOLIC BLOOD PRESSURE: 64 MMHG

## 2023-10-04 DIAGNOSIS — K63.1 PERFORATION OF COLON (HCC): Primary | ICD-10-CM

## 2023-10-04 DIAGNOSIS — R19.7 DIARRHEA, UNSPECIFIED TYPE: ICD-10-CM

## 2023-10-04 DIAGNOSIS — R05.1 ACUTE COUGH: ICD-10-CM

## 2023-10-04 DIAGNOSIS — J02.9 SORE THROAT: Primary | ICD-10-CM

## 2023-10-04 DIAGNOSIS — R10.30 LOWER ABDOMINAL PAIN: ICD-10-CM

## 2023-10-04 DIAGNOSIS — R19.5 CHANGE IN STOOL: ICD-10-CM

## 2023-10-04 PROBLEM — D80.2 IGA DEFICIENCY (HCC): Status: ACTIVE | Noted: 2023-10-04

## 2023-10-04 PROBLEM — D80.3: Status: RESOLVED | Noted: 2023-03-01 | Resolved: 2023-10-04

## 2023-10-04 PROBLEM — R94.6 ABNORMAL THYROID FUNCTION TEST: Status: ACTIVE | Noted: 2023-10-04

## 2023-10-04 LAB
ALBUMIN SERPL BCP-MCNC: 4.7 G/DL (ref 3.5–5)
ALP SERPL-CCNC: 81 U/L (ref 34–104)
ALT SERPL W P-5'-P-CCNC: 36 U/L (ref 7–52)
AMYLASE SERPL-CCNC: 28 IU/L (ref 29–103)
ANION GAP SERPL CALCULATED.3IONS-SCNC: 8 MMOL/L
AST SERPL W P-5'-P-CCNC: 24 U/L (ref 13–39)
BACTERIA UR QL AUTO: ABNORMAL /HPF
BILIRUB SERPL-MCNC: 0.52 MG/DL (ref 0.2–1)
BILIRUB UR QL STRIP: NEGATIVE
BUN SERPL-MCNC: 10 MG/DL (ref 5–25)
CALCIUM SERPL-MCNC: 9.9 MG/DL (ref 8.4–10.2)
CHLORIDE SERPL-SCNC: 101 MMOL/L (ref 96–108)
CLARITY UR: CLEAR
CO2 SERPL-SCNC: 29 MMOL/L (ref 21–32)
COLOR UR: ABNORMAL
CREAT SERPL-MCNC: 0.98 MG/DL (ref 0.6–1.3)
ERYTHROCYTE [DISTWIDTH] IN BLOOD BY AUTOMATED COUNT: 12.3 % (ref 11.6–15.1)
GFR SERPL CREATININE-BSD FRML MDRD: 110 ML/MIN/1.73SQ M
GLUCOSE SERPL-MCNC: 93 MG/DL (ref 65–140)
GLUCOSE UR STRIP-MCNC: NEGATIVE MG/DL
HCT VFR BLD AUTO: 40.8 % (ref 36.5–49.3)
HGB BLD-MCNC: 13.2 G/DL (ref 12–17)
HGB UR QL STRIP.AUTO: NEGATIVE
KETONES UR STRIP-MCNC: NEGATIVE MG/DL
LEUKOCYTE ESTERASE UR QL STRIP: NEGATIVE
LIPASE SERPL-CCNC: 9 U/L (ref 11–82)
MCH RBC QN AUTO: 27.8 PG (ref 26.8–34.3)
MCHC RBC AUTO-ENTMCNC: 32.4 G/DL (ref 31.4–37.4)
MCV RBC AUTO: 86 FL (ref 82–98)
MUCOUS THREADS UR QL AUTO: ABNORMAL
NITRITE UR QL STRIP: NEGATIVE
NON-SQ EPI CELLS URNS QL MICRO: ABNORMAL /HPF
PH UR STRIP.AUTO: 6 [PH]
PLATELET # BLD AUTO: 336 THOUSANDS/UL (ref 149–390)
PMV BLD AUTO: 10.3 FL (ref 8.9–12.7)
POTASSIUM SERPL-SCNC: 3.8 MMOL/L (ref 3.5–5.3)
PROT SERPL-MCNC: 7.6 G/DL (ref 6.4–8.4)
PROT UR STRIP-MCNC: ABNORMAL MG/DL
RBC # BLD AUTO: 4.74 MILLION/UL (ref 3.88–5.62)
RBC #/AREA URNS AUTO: ABNORMAL /HPF
SARS-COV-2 AG UPPER RESP QL IA: NEGATIVE
SODIUM SERPL-SCNC: 138 MMOL/L (ref 135–147)
SP GR UR STRIP.AUTO: 1.02 (ref 1–1.04)
UROBILINOGEN UA: 4 MG/DL
VALID CONTROL: NORMAL
WBC # BLD AUTO: 9.41 THOUSAND/UL (ref 4.31–10.16)
WBC #/AREA URNS AUTO: ABNORMAL /HPF

## 2023-10-04 PROCEDURE — 80053 COMPREHEN METABOLIC PANEL: CPT | Performed by: FAMILY MEDICINE

## 2023-10-04 PROCEDURE — 82150 ASSAY OF AMYLASE: CPT

## 2023-10-04 PROCEDURE — 81001 URINALYSIS AUTO W/SCOPE: CPT | Performed by: FAMILY MEDICINE

## 2023-10-04 PROCEDURE — 87811 SARS-COV-2 COVID19 W/OPTIC: CPT | Performed by: FAMILY MEDICINE

## 2023-10-04 PROCEDURE — 74177 CT ABD & PELVIS W/CONTRAST: CPT

## 2023-10-04 PROCEDURE — G1004 CDSM NDSC: HCPCS

## 2023-10-04 PROCEDURE — 99214 OFFICE O/P EST MOD 30 MIN: CPT | Performed by: FAMILY MEDICINE

## 2023-10-04 PROCEDURE — 85027 COMPLETE CBC AUTOMATED: CPT | Performed by: FAMILY MEDICINE

## 2023-10-04 PROCEDURE — 83690 ASSAY OF LIPASE: CPT

## 2023-10-04 PROCEDURE — 36415 COLL VENOUS BLD VENIPUNCTURE: CPT | Performed by: FAMILY MEDICINE

## 2023-10-04 RX ADMIN — IOHEXOL 100 ML: 350 INJECTION, SOLUTION INTRAVENOUS at 17:06

## 2023-10-04 NOTE — PROGRESS NOTES
Name: Joelle Mcconnell      : 2000      MRN: [id number]  Encounter Provider: Sol Crystal DO  Encounter Date: 10/4/2023   Encounter department: [place] [address]   #1. Sore throat  2. Acute cough  Rapid COVID-negative  I recommend gargling with salt water  May use Robitussin-DM for cough  3.  Acute lower abdominal pain with McBurney's point tenderness  4. Change Introl/diarrhea  Blood work ordered  Check stat CT abdomen  5. Return tomorrow for recheck, if worsens overnight report to ER    1. Sore throat  -     POCT Rapid Covid Ag    2. Diarrhea, unspecified type  -     CBC  -     Comprehensive metabolic panel  -     UA (URINE) with reflex to Scope  -     Amylase; Future  -     Lipase; Future  -     CT abdomen pelvis w contrast; Future; Expected date: 10/04/2023    3. Acute cough    4. Lower abdominal pain  -     CBC  -     Comprehensive metabolic panel  -     UA (URINE) with reflex to Scope  -     Amylase; Future  -     Lipase; Future  -     CT abdomen pelvis w contrast; Future; Expected date: 10/04/2023    5. Change in stool           Subjective      For last 4 to 5 days patient's had lower abdominal pain, occur both rather left lower quadrants. Has some loose stools seem darker but no enriqueta melena hematochezia or mucus. Denies fever chills does have some head congestion postnasal drip with scratchy throat and dry cough. Symptoms started last evening. No COVID test was done    Review of Systems   Constitutional: Negative for chills and fever. HENT:        HPI   Eyes: Negative. Respiratory:        HPI   Cardiovascular: Negative. Gastrointestinal:        HPI   Endocrine: Negative. Genitourinary: Negative. Musculoskeletal: Negative. Skin: Negative. Allergic/Immunologic: Negative. Neurological: Negative. Hematological: Negative. Psychiatric/Behavioral: Negative.         Current Outpatient Medications on File Prior to Visit Medication Sig   • atoMOXetine (STRATTERA) 25 mg capsule    • budesonide-formoterol (Symbicort) 160-4.5 mcg/act inhaler Inhale 2 puffs 2 (two) times a day Rinse mouth after use. • buPROPion (WELLBUTRIN XL) 300 mg 24 hr tablet    • carBAMazepine (TEGretol XR) 200 mg 12 hr tablet Take 1 tablet (200 mg total) by mouth 2 (two) times a day   • ergocalciferol (VITAMIN D2) 50,000 units Take 1 capsule (50,000 Units total) by mouth 2 (two) times a week   • hydrOXYzine pamoate (VISTARIL) 50 mg capsule Take 1 capsule (50 mg total) by mouth daily at bedtime as needed for anxiety (sleep difficulties)   • Multiple Vitamins-Iron (MULTIVITAMIN/IRON PO) Take by mouth   • Spacer/Aero-Holding Chambers (Vortex Valved Holding Chamber) MAHESH Use 2 (two) times a day   • [DISCONTINUED] desvenlafaxine succinate (PRISTIQ) 50 mg 24 hr tablet Take 1 tablet (50 mg total) by mouth daily       Objective     /64   Pulse (!) 108   Temp 98 °F (36.7 °C)   Ht 6' 1" (1.854 m)   Wt 132 kg (290 lb)   SpO2 98%   BMI 38.26 kg/m²     Physical Exam  Vitals and nursing note reviewed. Constitutional:       General: He is not in acute distress. Appearance: He is well-developed. He is obese. He is not ill-appearing, toxic-appearing or diaphoretic. HENT:      Head: Normocephalic and atraumatic. Right Ear: Tympanic membrane normal.      Left Ear: Tympanic membrane normal.      Nose: Nose normal.      Mouth/Throat:      Comments: Scant clear postnasal drip negative pharyngeal injection or exudate  Eyes:      General: No scleral icterus. Right eye: No discharge. Left eye: No discharge. Cardiovascular:      Rate and Rhythm: Normal rate and regular rhythm. Heart sounds: Normal heart sounds. Pulmonary:      Effort: Pulmonary effort is normal.      Breath sounds: Normal breath sounds. Abdominal:      General: There is no distension. Palpations: Abdomen is soft. There is no mass. Tenderness:  There is abdominal tenderness. There is guarding. There is no right CVA tenderness, left CVA tenderness or rebound. Hernia: No hernia is present. Comments: Decreased bowel sounds bilateral lower quadrant mild tenderness bilateral lower quadrants positive tender over McBurney's point with questionable voluntary guarding   Musculoskeletal:      Cervical back: Neck supple. No rigidity or tenderness. Right lower leg: No edema. Left lower leg: No edema. Lymphadenopathy:      Cervical: No cervical adenopathy. Skin:     General: Skin is warm and dry. Neurological:      General: No focal deficit present. Mental Status: He is alert.    Psychiatric:         Mood and Affect: Mood normal.       Tico Thompson DO

## 2023-10-04 NOTE — TELEPHONE ENCOUNTER
Called to speak to patient regarding his CT results. Report shows perforation distal colon. Spoke with patient - he states his pain is 1/10. Spoke with mother. Recommend taking patient for eval at ER. She requests [facility].  Call placed to [facility] ER and spoke with nurse triage / charge nurse.

## 2023-10-04 NOTE — PATIENT INSTRUCTIONS
For sore throat I recommend gargling with salt water every 3-4 hours  May use Robitussin-DM for cough  Complete blood work and CT scan today rule out appendicitis  Recheck tomorrow

## 2023-10-17 ENCOUNTER — OFFICE VISIT (OUTPATIENT)
Dept: FAMILY MEDICINE CLINIC | Facility: CLINIC | Age: 23
End: 2023-10-17
Payer: COMMERCIAL

## 2023-10-17 VITALS
DIASTOLIC BLOOD PRESSURE: 88 MMHG | HEART RATE: 86 BPM | WEIGHT: 286 LBS | HEIGHT: 73 IN | RESPIRATION RATE: 18 BRPM | OXYGEN SATURATION: 97 % | SYSTOLIC BLOOD PRESSURE: 130 MMHG | BODY MASS INDEX: 37.91 KG/M2

## 2023-10-17 DIAGNOSIS — K65.1 ABSCESS OF ABDOMINAL CAVITY (HCC): Primary | ICD-10-CM

## 2023-10-17 DIAGNOSIS — E66.01 SEVERE OBESITY (BMI 35.0-39.9) WITH COMORBIDITY (HCC): ICD-10-CM

## 2023-10-17 DIAGNOSIS — R10.30 LOWER ABDOMINAL PAIN: ICD-10-CM

## 2023-10-17 DIAGNOSIS — J45.20 MILD INTERMITTENT REACTIVE AIRWAY DISEASE WITHOUT COMPLICATION: ICD-10-CM

## 2023-10-17 DIAGNOSIS — K57.20 DIVERTICULITIS OF LARGE INTESTINE WITH ABSCESS WITHOUT BLEEDING: ICD-10-CM

## 2023-10-17 DIAGNOSIS — Z88.9 DRUG ALLERGY: ICD-10-CM

## 2023-10-17 PROCEDURE — 99214 OFFICE O/P EST MOD 30 MIN: CPT | Performed by: FAMILY MEDICINE

## 2023-10-17 RX ORDER — AMOXICILLIN AND CLAVULANATE POTASSIUM 875; 125 MG/1; MG/1
TABLET, FILM COATED ORAL
COMMUNITY
Start: 2023-10-06

## 2023-10-17 NOTE — ASSESSMENT & PLAN NOTE
Have surgery currently on Augmentin doing well.   Follow-up CT 10/13 doing much better to follow with general surgery 10/20/2023

## 2023-10-17 NOTE — PATIENT INSTRUCTIONS
Finish antibiotic  Follow-up with general surgery as planned  Return at scheduled appointment sooner if needed

## 2023-10-17 NOTE — PROGRESS NOTES
Name: Baldo Green      : 2000      MRN: [id number]  Encounter Provider: Miriam Doll DO  Encounter Date: 10/17/2023   Encounter department: [facility] [address]     1. Diverticulitis with abscess/abdominal pain  2. Abscess abdominal cavity  Patient was treated with Augmentin, no surgery  Patient to follow-up with general surgery as planned 10/20/2023  Note will be given because he missed his evaluation for classes  3. Reactive airway disease, follows with allergy lungs are clear  4. BMI 37.73 patient did lose 4 pounds  5. Penicillin allergy was removed from the chart as he tolerated Augmentin without any problems  6  Return at scheduled appointment sooner if needed      1. Abscess of abdominal cavity [facility])  Assessment & Plan:  Follow with general surgeon      2. Diverticulitis of large intestine with abscess without bleeding  Assessment & Plan:  Have surgery currently on Augmentin doing well. Follow-up CT 10/13 doing much better to follow with general surgery 10/20/2023      3. Mild intermittent reactive airway disease without complication  Assessment & Plan:  Follows with allergist lungs are clear      4. Severe obesity (BMI 35.0-39. 9) with comorbidity ([address])  Assessment & Plan:  BMI is 37.73. Patient lost 4 pounds      5. Lower abdominal pain    6. Drug allergy         Subjective      We have [facility] from  to  with perforated diverticulitis and abscess. Patient is following with surgery at [facility] had a recent CT 10/13/2023 which shows marked improvement patient did not have any procedure surgery is on Augmentin. Discussed with mother. Patient has no penicillin allergies we did off his computer allergy panel. Patient is doing well no nausea vomiting abdominal pain fever chills.   Patient has an appointment this Friday, 10/20/2023 for follow-up with surgery department at [address] [place] [address],[address] Constitutional:  Negative for chills and fever. HENT: Negative. Eyes: Negative. Respiratory: Negative. Cardiovascular: Negative. Gastrointestinal:         Hpi   Endocrine: Negative. Genitourinary: Negative. Musculoskeletal: Negative. Skin: Negative. Allergic/Immunologic: Negative. Neurological: Negative. Hematological: Negative. Psychiatric/Behavioral: Negative. Current Outpatient Medications on File Prior to Visit   Medication Sig   • amoxicillin-clavulanate (AUGMENTIN) 875-125 mg per tablet TAKE 1 TABLET BY MOUTH EVERY 12 HOURS FOR 7 DAYS   • atoMOXetine (STRATTERA) 25 mg capsule    • budesonide-formoterol (Symbicort) 160-4.5 mcg/act inhaler Inhale 2 puffs 2 (two) times a day Rinse mouth after use. • buPROPion (WELLBUTRIN XL) 300 mg 24 hr tablet    • carBAMazepine (TEGretol XR) 200 mg 12 hr tablet Take 1 tablet (200 mg total) by mouth 2 (two) times a day   • ergocalciferol (VITAMIN D2) 50,000 units Take 1 capsule (50,000 Units total) by mouth 2 (two) times a week   • hydrOXYzine pamoate (VISTARIL) 50 mg capsule Take 1 capsule (50 mg total) by mouth daily at bedtime as needed for anxiety (sleep difficulties)   • Multiple Vitamins-Iron (MULTIVITAMIN/IRON PO) Take by mouth   • Spacer/Aero-Holding Chambers (Vortex Valved Holding Chamber) MAHESH Use 2 (two) times a day   • [DISCONTINUED] desvenlafaxine succinate (PRISTIQ) 50 mg 24 hr tablet Take 1 tablet (50 mg total) by mouth daily       Objective     /88 (BP Location: Left arm, Patient Position: Sitting, Cuff Size: Large)   Pulse 86   Resp 18   Ht 6' 1" (1.854 m)   Wt 130 kg (286 lb)   SpO2 97%   BMI 37.73 kg/m²     Physical Exam  Vitals and nursing note reviewed. Constitutional:       General: He is not in acute distress. Appearance: Normal appearance. He is not ill-appearing, toxic-appearing or diaphoretic. HENT:      Head: Normocephalic and atraumatic.       Mouth/Throat:      Mouth: Mucous membranes are moist.   Eyes:      General: No scleral icterus. Cardiovascular:      Rate and Rhythm: Normal rate and regular rhythm. Heart sounds: Normal heart sounds. Pulmonary:      Effort: Pulmonary effort is normal.      Breath sounds: Normal breath sounds. Abdominal:      General: Bowel sounds are normal. There is no distension. Palpations: Abdomen is soft. There is no mass. Tenderness: There is no abdominal tenderness. There is no right CVA tenderness, left CVA tenderness, guarding or rebound. Hernia: No hernia is present. Musculoskeletal:      Cervical back: Neck supple. No rigidity. Right lower leg: No edema. Left lower leg: No edema. Skin:     General: Skin is warm and dry. Neurological:      General: No focal deficit present. Mental Status: He is alert.    Psychiatric:         Mood and Affect: Mood normal.       Tico Thompson DO

## 2023-10-17 NOTE — LETTER
October 17, 2023     Patient: Diego Segovia   YOB: 2000   Date of Visit: 10/17/2023       To Whom It May Concern: It is my medical opinion that Diego Segovia could not have attended classes. Patient was admitted at [facility] in [facility] from October 4 to 6. Patient will follow-up with his surgeon on 10/20/2023 for evaluation for return to class    If you have any questions or concerns, please don't hesitate to call.          Sincerely,        Tico Thompson,     CC: No Recipients

## 2024-01-25 ENCOUNTER — RA CDI HCC (OUTPATIENT)
Dept: OTHER | Facility: HOSPITAL | Age: 24
End: 2024-01-25

## 2024-01-25 NOTE — PROGRESS NOTES
HCC coding opportunities       Chart reviewed, no opportunity found: CHART REVIEWED, NO OPPORTUNITY FOUND        Patients Insurance        Commercial Insurance: mention Insurance

## 2024-06-04 DIAGNOSIS — E55.9 VITAMIN D DEFICIENCY: ICD-10-CM

## 2024-06-05 RX ORDER — ERGOCALCIFEROL 1.25 MG/1
50000 CAPSULE ORAL 2 TIMES WEEKLY
Qty: 26 CAPSULE | Refills: 3 | Status: SHIPPED | OUTPATIENT
Start: 2024-06-06

## 2024-08-20 ENCOUNTER — OFFICE VISIT (OUTPATIENT)
Dept: FAMILY MEDICINE CLINIC | Facility: CLINIC | Age: 24
End: 2024-08-20
Payer: COMMERCIAL

## 2024-08-20 VITALS
SYSTOLIC BLOOD PRESSURE: 118 MMHG | WEIGHT: 273 LBS | TEMPERATURE: 97.6 F | DIASTOLIC BLOOD PRESSURE: 70 MMHG | HEIGHT: 73 IN | HEART RATE: 114 BPM | OXYGEN SATURATION: 96 % | BODY MASS INDEX: 36.18 KG/M2 | RESPIRATION RATE: 16 BRPM

## 2024-08-20 DIAGNOSIS — F31.32 BIPOLAR 1 DISORDER, DEPRESSED, MODERATE (HCC): Primary | ICD-10-CM

## 2024-08-20 DIAGNOSIS — F90.0 ATTENTION DEFICIT HYPERACTIVITY DISORDER, INATTENTIVE TYPE: ICD-10-CM

## 2024-08-20 DIAGNOSIS — F84.0 AUTISM SPECTRUM DISORDER: ICD-10-CM

## 2024-08-20 PROCEDURE — 99214 OFFICE O/P EST MOD 30 MIN: CPT | Performed by: NURSE PRACTITIONER

## 2024-08-20 NOTE — PROGRESS NOTES
Ambulatory Visit  Name: Daryl Cantro      : 2000      MRN: [id number]  Encounter Provider: JOSIE Calzada  Encounter Date: 2024   Encounter department: [facility]    Assessment & Plan   1. Bipolar 1 disorder, depressed, moderate (HCC)  Assessment & Plan:  Patient's symptoms are stable on current medication regimen.  Patient continues to follow with psychiatry regarding this condition.  2. Autism spectrum disorder  Assessment & Plan:  Patient's symptoms are stable on current medication regimen.  Patient continues to follow with psychiatry regarding this condition.  3. Attention deficit hyperactivity disorder, inattentive type  Assessment & Plan:  Patient's symptoms are stable on current medication regimen.  Patient continues to follow with psychiatry regarding this condition.       History of Present Illness     Bipolar I disorder/autism spectrum disorder/ADHD: Patient continues to follow with psychiatry regarding this and his guardian reports that he is currently stable on dosages of Strattera, Wellbutrin, Tegretol, and hydroxyzine.        Review of Systems   Constitutional:  Negative for chills and fever.   HENT:  Negative for ear pain and sore throat.    Eyes:  Negative for pain and visual disturbance.   Respiratory:  Negative for cough, chest tightness, shortness of breath and wheezing.    Cardiovascular:  Negative for chest pain, palpitations and leg swelling.   Gastrointestinal:  Negative for abdominal pain, constipation, diarrhea, nausea and vomiting.   Endocrine: Negative for cold intolerance and heat intolerance.   Genitourinary:  Negative for decreased urine volume, dysuria and hematuria.   Musculoskeletal:  Negative for arthralgias, back pain and myalgias.   Skin:  Negative for color change and rash.   Allergic/Immunologic: Negative for environmental allergies.   Neurological:  Negative for dizziness, seizures, syncope, weakness, light-headedness,  "numbness and headaches.   Hematological:  Negative for adenopathy.   Psychiatric/Behavioral:  Negative for agitation, behavioral problems, confusion, decreased concentration, dysphoric mood, hallucinations, self-injury, sleep disturbance and suicidal ideas. The patient is not nervous/anxious and is not hyperactive.    All other systems reviewed and are negative.      Objective     /70 (BP Location: Left arm, Patient Position: Sitting, Cuff Size: Adult)   Pulse (!) 114   Temp 97.6 °F (36.4 °C) (Temporal)   Resp 16   Ht 6' 1\" (1.854 m)   Wt 124 kg (273 lb)   SpO2 96%   BMI 36.02 kg/m²     Physical Exam  Vitals and nursing note reviewed.   Constitutional:       General: He is not in acute distress.     Appearance: Normal appearance. He is well-developed. He is not ill-appearing.   HENT:      Head: Normocephalic.   Eyes:      Conjunctiva/sclera: Conjunctivae normal.   Cardiovascular:      Rate and Rhythm: Normal rate and regular rhythm.      Pulses: Normal pulses.           Carotid pulses are 2+ on the right side and 2+ on the left side.       Radial pulses are 2+ on the right side and 2+ on the left side.        Posterior tibial pulses are 2+ on the right side and 2+ on the left side.      Heart sounds: Normal heart sounds. No murmur heard.  Pulmonary:      Effort: Pulmonary effort is normal. No respiratory distress.      Breath sounds: Normal breath sounds. No decreased breath sounds, wheezing, rhonchi or rales.   Abdominal:      General: Abdomen is flat. Bowel sounds are normal. There is no distension.      Palpations: Abdomen is soft.      Tenderness: There is no abdominal tenderness. There is no guarding.   Musculoskeletal:         General: No swelling. Normal range of motion.      Cervical back: Normal range of motion and neck supple.      Right lower leg: No edema.      Left lower leg: No edema.   Skin:     General: Skin is warm and dry.      Capillary Refill: Capillary refill takes less than 2 " seconds.   Neurological:      General: No focal deficit present.      Mental Status: He is alert and oriented to person, place, and time.   Psychiatric:         Mood and Affect: Mood normal.         Behavior: Behavior normal.         Thought Content: Thought content normal.         Judgment: Judgment normal.       Administrative Statements

## 2024-08-20 NOTE — ASSESSMENT & PLAN NOTE
Patient's symptoms are stable on current medication regimen.  Patient continues to follow with psychiatry regarding this condition.

## 2024-08-20 NOTE — PROGRESS NOTES
Adult Annual Physical  Name: Daryl Cantor      : 2000      MRN: [id number]  Encounter Provider: JOSIE Calzada  Encounter Date: 2024   Encounter department: [facility] PRIMARY CARE    Assessment & Plan   {There are no diagnoses linked to this encounter. (Refresh or delete this SmartLink)}  Immunizations and preventive care screenings were discussed with patient today. Appropriate education was printed on patient's after visit summary.    Counseling:  Alcohol/drug use: discussed moderation in alcohol intake, the recommendations for healthy alcohol use, and avoidance of illicit drug use.  Dental Health: discussed importance of regular tooth brushing, flossing, and dental visits.  Injury prevention: discussed safety/seat belts, safety helmets, smoke detectors, carbon dioxide detectors, and smoking near bedding or upholstery.  Sexual health: discussed sexually transmitted diseases, partner selection, use of condoms, avoidance of unintended pregnancy, and contraceptive alternatives.  Exercise: the importance of regular exercise/physical activity was discussed. Recommend exercise 3-5 times per week for at least 30 minutes.     Bipolar I disorder/autism spectrum disorder/ADHD: Patient continues to follow with psychiatry regarding this and his guardian reports that he is currently stable on dosages of Strattera, Wellbutrin, Tegretol, and hydroxyzine.     History of Present Illness   {Disappearing Hyperlinks I Encounters * My Last Note * Since Last Visit * History :76974}  Adult Annual Physical  Review of Systems   Constitutional:  Negative for appetite change and fever.   Respiratory:  Negative for chest tightness and shortness of breath.    Cardiovascular:  Negative for chest pain, palpitations and leg swelling.   Gastrointestinal:  Negative for abdominal pain.   Genitourinary:  Negative for difficulty urinating.   Musculoskeletal:  Negative for arthralgias and myalgias.   Skin:   "Negative for wound.   Neurological:  Negative for dizziness, light-headedness and headaches.   Psychiatric/Behavioral:  Negative for dysphoric mood and sleep disturbance. The patient is not nervous/anxious.      {Select to Display PM (Optional):21242}    Objective   {Disappearing Hyperlinks   Review Vitals * Enter New Vitals * Results Review * Labs * Imaging * Cardiology * Procedures * Lung Cancer Screening :65114}  /70 (BP Location: Left arm, Patient Position: Sitting, Cuff Size: Adult)   Pulse (!) 114   Temp 97.6 °F (36.4 °C) (Temporal)   Resp 16   Ht 6' 1\" (1.854 m)   Wt 124 kg (273 lb)   SpO2 96%   BMI 36.02 kg/m²     Physical Exam  {Administrative / Billing Section (Optional):50530}  "

## 2024-12-19 ENCOUNTER — OFFICE VISIT (OUTPATIENT)
Dept: FAMILY MEDICINE CLINIC | Facility: CLINIC | Age: 24
End: 2024-12-19
Payer: COMMERCIAL

## 2024-12-19 VITALS
SYSTOLIC BLOOD PRESSURE: 126 MMHG | OXYGEN SATURATION: 99 % | HEIGHT: 73 IN | BODY MASS INDEX: 37.11 KG/M2 | HEART RATE: 92 BPM | WEIGHT: 280 LBS | DIASTOLIC BLOOD PRESSURE: 76 MMHG

## 2024-12-19 DIAGNOSIS — F31.32 BIPOLAR 1 DISORDER, DEPRESSED, MODERATE (HCC): ICD-10-CM

## 2024-12-19 DIAGNOSIS — Z23 ENCOUNTER FOR IMMUNIZATION: ICD-10-CM

## 2024-12-19 DIAGNOSIS — E55.9 VITAMIN D DEFICIENCY: ICD-10-CM

## 2024-12-19 DIAGNOSIS — R94.6 ABNORMAL THYROID FUNCTION TEST: ICD-10-CM

## 2024-12-19 DIAGNOSIS — D80.2 IGA DEFICIENCY (HCC): ICD-10-CM

## 2024-12-19 DIAGNOSIS — E66.01 SEVERE OBESITY (BMI 35.0-39.9) WITH COMORBIDITY (HCC): ICD-10-CM

## 2024-12-19 DIAGNOSIS — Z00.00 HEALTHCARE MAINTENANCE: Primary | ICD-10-CM

## 2024-12-19 DIAGNOSIS — E78.1 HYPERTRIGLYCERIDEMIA: ICD-10-CM

## 2024-12-19 DIAGNOSIS — D50.9 IRON DEFICIENCY ANEMIA, UNSPECIFIED IRON DEFICIENCY ANEMIA TYPE: ICD-10-CM

## 2024-12-19 DIAGNOSIS — F90.0 ATTENTION DEFICIT HYPERACTIVITY DISORDER, INATTENTIVE TYPE: ICD-10-CM

## 2024-12-19 DIAGNOSIS — F84.0 AUTISM SPECTRUM DISORDER: ICD-10-CM

## 2024-12-19 DIAGNOSIS — J45.20 MILD INTERMITTENT REACTIVE AIRWAY DISEASE WITHOUT COMPLICATION: ICD-10-CM

## 2024-12-19 PROBLEM — K65.1 ABSCESS OF ABDOMINAL CAVITY (HCC): Status: RESOLVED | Noted: 2023-10-17 | Resolved: 2024-12-19

## 2024-12-19 PROBLEM — K57.20 DIVERTICULITIS OF LARGE INTESTINE WITH ABSCESS WITHOUT BLEEDING: Status: RESOLVED | Noted: 2023-10-17 | Resolved: 2024-12-19

## 2024-12-19 PROCEDURE — 90472 IMMUNIZATION ADMIN EACH ADD: CPT

## 2024-12-19 PROCEDURE — 99395 PREV VISIT EST AGE 18-39: CPT | Performed by: FAMILY MEDICINE

## 2024-12-19 PROCEDURE — 90471 IMMUNIZATION ADMIN: CPT

## 2024-12-19 PROCEDURE — 90621 MENB-FHBP VACC 2/3 DOSE IM: CPT

## 2024-12-19 PROCEDURE — 90715 TDAP VACCINE 7 YRS/> IM: CPT

## 2024-12-19 NOTE — PROGRESS NOTES
Adult Annual Physical  Name: Daryl Cantor      : 2000      MRN: [id number]  Encounter Provider: Tico Thompson DO  Encounter Date: 2024   Encounter department: [facility] PRIMARY CARE  1.  Healthcare maintenance  Annual exam completed  Adacel and Trumenba given  2.  Iron deficiency anemia, blood work ordered  3.  Hypertriglyceridemia blood work ordered  4.  Vitamin D deficiency blood work ordered patient is not taking any vitamin D at this point  5.  Abnormal thyroid function test, blood work ordered  6.  Asthma/IgA deficiency, stable lungs are clear follows with allergist  7.  ADHD/autism/bipolar, stable follows with psychiatry  8.  BMI 36.94 patient gained 7 pounds diet exercise weight loss recommended  9. Return in 1 year sooner if needed    Assessment & Plan  Healthcare maintenance  Annual exam completed, Adacel and Trumenba vaccines given       Iron deficiency anemia, unspecified iron deficiency anemia type  Blood work ordered  Orders:  •  CBC; Future  •  Comprehensive metabolic panel  •  Lipid Panel with Direct LDL reflex  •  TSH, 3rd generation with Free T4 reflex  •  UA (URINE) with reflex to Scope; Future  •  Vitamin D 25 hydroxy; Future  •  Ferritin; Future  •  Iron; Future    Hypertriglyceridemia  Blood work ordered  Orders:  •  CBC; Future  •  Comprehensive metabolic panel  •  Lipid Panel with Direct LDL reflex  •  TSH, 3rd generation with Free T4 reflex  •  UA (URINE) with reflex to Scope; Future  •  Vitamin D 25 hydroxy; Future  •  Ferritin; Future  •  Iron; Future    Vitamin D deficiency  Blood work ordered  Orders:  •  CBC; Future  •  Comprehensive metabolic panel  •  Lipid Panel with Direct LDL reflex  •  TSH, 3rd generation with Free T4 reflex  •  UA (URINE) with reflex to Scope; Future  •  Vitamin D 25 hydroxy; Future  •  Ferritin; Future  •  Iron; Future    Abnormal thyroid function test  Blood work ordered  Orders:  •  CBC; Future  •  Comprehensive metabolic  panel  •  Lipid Panel with Direct LDL reflex  •  TSH, 3rd generation with Free T4 reflex  •  UA (URINE) with reflex to Scope; Future  •  Vitamin D 25 hydroxy; Future  •  Ferritin; Future  •  Iron; Future    IgA deficiency (HCC)  Stable, follows with allergist       Mild intermittent reactive airway disease without complication  Stable, lungs are clear follows with allergist       Encounter for immunization    Orders:  •  TDAP VACCINE GREATER THAN OR EQUAL TO 6YO IM  •  MENINGOCOCCAL B RECOMBINANT    Attention deficit hyperactivity disorder, inattentive type  Stable follows with psychiatry       Autism spectrum disorder  Stable follows with psychiatry       Bipolar 1 disorder, depressed, moderate (HCC)  Stable follows with psychiatry       Severe obesity (BMI 35.0-39.9) with comorbidity (HCC)  .  BMI 36.94 patient gained 7 pounds diet exercise weight loss recommended       Immunizations and preventive care screenings were discussed with patient today. Appropriate education was printed on patient's after visit summary.    Counseling:  Immunizations         History of Present Illness     Adult Annual Physical:  Patient presents for annual physical. Patient here for physical without any current medical complaints or concerns.     Diet and Physical Activity:  - Diet/Nutrition: well balanced diet.  - Exercise: 3-4 times a week on average.    General Health:  - Sleep: sleeps well.  - Hearing: normal hearing right ear.  - Vision: no vision problems.  - Dental: no dental visits for > 1 year.    /GYN Health:    - History of STDs: no     Health:  - History of STDs: no.     Advanced Care Planning:  - Has an advanced directive?: no    - Has a durable medical POA?: no    - ACP document given to patient?: no      Review of Systems   Constitutional: Negative.    HENT: Negative.     Eyes: Negative.    Respiratory: Negative.     Cardiovascular: Negative.    Gastrointestinal: Negative.    Endocrine: Negative.    Genitourinary:  "Negative.    Musculoskeletal: Negative.    Skin: Negative.    Allergic/Immunologic: Negative.    Neurological: Negative.    Hematological: Negative.    Psychiatric/Behavioral: Negative.           Objective   /76 (BP Location: Right arm, Patient Position: Sitting, Cuff Size: Standard)   Pulse 92   Ht 6' 1\" (1.854 m)   Wt 127 kg (280 lb)   SpO2 99%   BMI 36.94 kg/m²     Physical Exam  Vitals and nursing note reviewed.   Constitutional:       Appearance: Normal appearance. He is obese.   HENT:      Head: Normocephalic and atraumatic.      Right Ear: Tympanic membrane normal.      Left Ear: Tympanic membrane normal.      Nose: Nose normal.      Mouth/Throat:      Mouth: Mucous membranes are moist.      Pharynx: Oropharynx is clear. No oropharyngeal exudate or posterior oropharyngeal erythema.   Eyes:      General: No scleral icterus.  Neck:      Vascular: No carotid bruit.   Cardiovascular:      Rate and Rhythm: Normal rate and regular rhythm.      Heart sounds: Normal heart sounds.   Pulmonary:      Effort: Pulmonary effort is normal.      Breath sounds: Normal breath sounds.   Abdominal:      General: Bowel sounds are normal.      Palpations: Abdomen is soft.      Tenderness: There is no abdominal tenderness.   Musculoskeletal:      Cervical back: Neck supple.      Right lower leg: No edema.      Left lower leg: No edema.   Lymphadenopathy:      Cervical: No cervical adenopathy.   Skin:     General: Skin is warm and dry.   Neurological:      General: No focal deficit present.      Mental Status: He is alert and oriented to person, place, and time.   Psychiatric:         Mood and Affect: Mood normal.         "

## 2024-12-19 NOTE — ASSESSMENT & PLAN NOTE
Blood work ordered  Orders:  •  CBC; Future  •  Comprehensive metabolic panel  •  Lipid Panel with Direct LDL reflex  •  TSH, 3rd generation with Free T4 reflex  •  UA (URINE) with reflex to Scope; Future  •  Vitamin D 25 hydroxy; Future  •  Ferritin; Future  •  Iron; Future

## 2024-12-19 NOTE — PROGRESS NOTES
"Name: Daryl Cantor      : 2000      MRN: [id number]  Encounter Provider: Tico Thompson DO  Encounter Date: 2024   Encounter department: [facility] PRIMARY CARE  :  Assessment & Plan  Iron deficiency anemia, unspecified iron deficiency anemia type         Hypertriglyceridemia         Vitamin D deficiency         Abnormal thyroid function test                History of Present Illness {?Quick Links Encounters * My Last Note * Last Note in Specialty * Snapshot * Since Last Visit * History :92780}    HPI  Review of Systems    Objective {?Quick Links Trend Vitals * Enter New Vitals * Results Review * Timeline (Adult) * Labs * Imaging * Cardiology * Procedures * Lung Cancer Screening * Surgical eConsent :43150}  /76 (BP Location: Right arm, Patient Position: Sitting, Cuff Size: Standard)   Pulse (!) 112   Ht 6' 1\" (1.854 m)   Wt 127 kg (280 lb)   SpO2 99%   BMI 36.94 kg/m²      Physical Exam  {Administrative / Billing Section (Optional):74532}  "

## 2024-12-19 NOTE — PATIENT INSTRUCTIONS
Continue present therapy  Follow with all specialist per their instructions  Diet exercise weight loss recommended  Complete blood work as ordered  Return in 1 year sooner if needed

## 2025-01-18 PROBLEM — Z00.00 HEALTHCARE MAINTENANCE: Status: RESOLVED | Noted: 2022-07-06 | Resolved: 2025-01-18

## 2025-04-24 ENCOUNTER — OFFICE VISIT (OUTPATIENT)
Dept: FAMILY MEDICINE CLINIC | Facility: CLINIC | Age: 25
End: 2025-04-24
Payer: COMMERCIAL

## 2025-04-24 VITALS
HEART RATE: 89 BPM | SYSTOLIC BLOOD PRESSURE: 136 MMHG | DIASTOLIC BLOOD PRESSURE: 70 MMHG | RESPIRATION RATE: 18 BRPM | HEIGHT: 73 IN | TEMPERATURE: 97.7 F | OXYGEN SATURATION: 97 % | WEIGHT: 289 LBS | BODY MASS INDEX: 38.3 KG/M2

## 2025-04-24 DIAGNOSIS — L04.0 ACUTE CERVICAL ADENITIS: ICD-10-CM

## 2025-04-24 DIAGNOSIS — J02.9 SORE THROAT: Primary | ICD-10-CM

## 2025-04-24 DIAGNOSIS — J01.10 ACUTE NON-RECURRENT FRONTAL SINUSITIS: ICD-10-CM

## 2025-04-24 DIAGNOSIS — K12.2 UVULITIS: ICD-10-CM

## 2025-04-24 DIAGNOSIS — F31.32 BIPOLAR 1 DISORDER, DEPRESSED, MODERATE (HCC): ICD-10-CM

## 2025-04-24 DIAGNOSIS — H66.001 ACUTE SUPPURATIVE OTITIS MEDIA OF RIGHT EAR WITHOUT SPONTANEOUS RUPTURE OF TYMPANIC MEMBRANE, RECURRENCE NOT SPECIFIED: ICD-10-CM

## 2025-04-24 DIAGNOSIS — J30.9 ALLERGIC RHINITIS, UNSPECIFIED SEASONALITY, UNSPECIFIED TRIGGER: ICD-10-CM

## 2025-04-24 DIAGNOSIS — J45.20 MILD INTERMITTENT REACTIVE AIRWAY DISEASE WITHOUT COMPLICATION: ICD-10-CM

## 2025-04-24 DIAGNOSIS — D80.2 IGA DEFICIENCY (HCC): ICD-10-CM

## 2025-04-24 DIAGNOSIS — H69.90 DYSFUNCTION OF EUSTACHIAN TUBE, UNSPECIFIED LATERALITY: ICD-10-CM

## 2025-04-24 LAB — S PYO AG THROAT QL: NEGATIVE

## 2025-04-24 PROCEDURE — 87880 STREP A ASSAY W/OPTIC: CPT | Performed by: FAMILY MEDICINE

## 2025-04-24 PROCEDURE — 99214 OFFICE O/P EST MOD 30 MIN: CPT | Performed by: FAMILY MEDICINE

## 2025-04-24 RX ORDER — METHYLPREDNISOLONE 4 MG/1
TABLET ORAL
Qty: 1 EACH | Refills: 0 | Status: SHIPPED | OUTPATIENT
Start: 2025-04-24 | End: 2025-04-30

## 2025-04-24 RX ORDER — AZELASTINE 1 MG/ML
2 SPRAY, METERED NASAL 2 TIMES DAILY
Qty: 30 ML | Refills: 3 | Status: SHIPPED | OUTPATIENT
Start: 2025-04-24

## 2025-04-24 RX ORDER — SULFAMETHOXAZOLE AND TRIMETHOPRIM 800; 160 MG/1; MG/1
1 TABLET ORAL EVERY 12 HOURS SCHEDULED
Qty: 14 TABLET | Refills: 0 | Status: SHIPPED | OUTPATIENT
Start: 2025-04-24 | End: 2025-05-01

## 2025-04-24 NOTE — ASSESSMENT & PLAN NOTE
Lungs are clear.  Patient follows with allergist  Orders:  •  methylPREDNISolone 4 MG tablet therapy pack; Use as directed on package

## 2025-04-24 NOTE — PROGRESS NOTES
Name: Daryl Cantor      : 2000      MRN: [id number]  Encounter Provider: Tico Thompson DO  Encounter Date: 2025   Encounter department: [facility] PRIMARY CARE  Return in 1 week if still with symptoms  :  Assessment & Plan  Sore throat  Rapid strep negative  Orders:  •  POCT rapid ANTIGEN strepA  •  methylPREDNISolone 4 MG tablet therapy pack; Use as directed on package    Uvulitis  Medrol Dosepak ordered  Bactrim DS ordered patient is allergic to penicillin and Zithromax  Orders:  •  methylPREDNISolone 4 MG tablet therapy pack; Use as directed on package    Acute suppurative otitis media of right ear without spontaneous rupture of tympanic membrane, recurrence not specified  Bactrim ordered as above  Orders:  •  sulfamethoxazole-trimethoprim (BACTRIM DS) 800-160 mg per tablet; Take 1 tablet by mouth every 12 (twelve) hours for 7 days  •  methylPREDNISolone 4 MG tablet therapy pack; Use as directed on package    Allergic rhinitis, unspecified seasonality, unspecified trigger  Patient follows with allergist, Medrol Dosepak ordered.  Astelin nasal spray ordered  Orders:  •  sulfamethoxazole-trimethoprim (BACTRIM DS) 800-160 mg per tablet; Take 1 tablet by mouth every 12 (twelve) hours for 7 days  •  azelastine (ASTELIN) 0.1 % nasal spray; 2 sprays into each nostril 2 (two) times a day Use in each nostril as directed  •  methylPREDNISolone 4 MG tablet therapy pack; Use as directed on package    Mild intermittent reactive airway disease without complication  Lungs are clear.  Patient follows with allergist  Orders:  •  methylPREDNISolone 4 MG tablet therapy pack; Use as directed on package    Dysfunction of Eustachian tube, unspecified laterality  As above  Orders:  •  azelastine (ASTELIN) 0.1 % nasal spray; 2 sprays into each nostril 2 (two) times a day Use in each nostril as directed  •  methylPREDNISolone 4 MG tablet therapy pack; Use as directed on package    Acute non-recurrent  "frontal sinusitis  Bactrim DS ordered  Orders:  •  sulfamethoxazole-trimethoprim (BACTRIM DS) 800-160 mg per tablet; Take 1 tablet by mouth every 12 (twelve) hours for 7 days  •  methylPREDNISolone 4 MG tablet therapy pack; Use as directed on package    Acute cervical adenitis  Bactrim DS ordered  Orders:  •  methylPREDNISolone 4 MG tablet therapy pack; Use as directed on package    Bipolar 1 disorder, depressed, moderate (HCC)  Stable in office today mood is normal patient follows with psychiatry       IgA deficiency ([facility])  Stable, follows with immunologist/allergist, Dr. Valladares              History of Present Illness   Patient awoke this morning with sore throat and feels his uvula is \"large.  Some gagging secondary to this.  No fever or chills patient denies headache vertigo.  No medication has been taken      Review of Systems   Constitutional:  Negative for appetite change, chills, diaphoresis, fatigue and fever.   HENT:  Positive for postnasal drip, sore throat and trouble swallowing. Negative for congestion, ear pain, facial swelling, rhinorrhea, sinus pressure, sinus pain and voice change.    Eyes: Negative.    Respiratory:  Negative for cough, chest tightness, shortness of breath and wheezing.    Cardiovascular:  Negative for chest pain.   Gastrointestinal:  Negative for abdominal pain, diarrhea, nausea and vomiting.   Musculoskeletal:  Negative for myalgias.   Neurological:  Negative for headaches.       Objective   /70 (BP Location: Right arm, Patient Position: Sitting, Cuff Size: Adult)   Pulse 89   Temp 97.7 °F (36.5 °C) (Temporal)   Resp 18   Ht 6' 1\" (1.854 m)   Wt 131 kg (289 lb)   SpO2 97%   BMI 38.13 kg/m²      Physical Exam  Vitals and nursing note reviewed.   Constitutional:       General: He is not in acute distress.     Appearance: Normal appearance. He is not ill-appearing, toxic-appearing or diaphoretic.   HENT:      Head: Normocephalic and atraumatic.      Ears:      Comments: Right " TM is dull and injected left TM dull no injection no fluid     Nose:      Comments: Positive mild bilateral frontal sinus tenderness to percussion     Mouth/Throat:      Comments: Scant off-white postnasal drip.  Posterior pharynx is mildly injected no exudate uvula is mildly edematous and erythematous  Eyes:      Conjunctiva/sclera: Conjunctivae normal.   Neck:      Comments: Positive tender anterior cervical adenopathy  Cardiovascular:      Rate and Rhythm: Normal rate and regular rhythm.      Heart sounds: Normal heart sounds.   Pulmonary:      Effort: Pulmonary effort is normal.      Breath sounds: Normal breath sounds.   Musculoskeletal:      Cervical back: Neck supple. Tenderness present. No rigidity.   Lymphadenopathy:      Cervical: Cervical adenopathy present.   Skin:     General: Skin is warm and dry.   Neurological:      General: No focal deficit present.      Mental Status: He is alert.   Psychiatric:         Mood and Affect: Mood normal.

## 2025-04-24 NOTE — ASSESSMENT & PLAN NOTE
As above  Orders:  •  azelastine (ASTELIN) 0.1 % nasal spray; 2 sprays into each nostril 2 (two) times a day Use in each nostril as directed  •  methylPREDNISolone 4 MG tablet therapy pack; Use as directed on package

## 2025-04-24 NOTE — ASSESSMENT & PLAN NOTE
Patient follows with allergist, Doyle Dosepak ordered.  Astelin nasal spray ordered  Orders:  •  sulfamethoxazole-trimethoprim (BACTRIM DS) 800-160 mg per tablet; Take 1 tablet by mouth every 12 (twelve) hours for 7 days  •  azelastine (ASTELIN) 0.1 % nasal spray; 2 sprays into each nostril 2 (two) times a day Use in each nostril as directed  •  methylPREDNISolone 4 MG tablet therapy pack; Use as directed on package

## 2025-04-24 NOTE — ASSESSMENT & PLAN NOTE
Bactrim DS ordered  Orders:  •  sulfamethoxazole-trimethoprim (BACTRIM DS) 800-160 mg per tablet; Take 1 tablet by mouth every 12 (twelve) hours for 7 days  •  methylPREDNISolone 4 MG tablet therapy pack; Use as directed on package

## 2025-04-24 NOTE — PATIENT INSTRUCTIONS
Bactrim DS 1 twice daily for 1 week  Finish Medrol Dosepak as directed and pack  Use Astelin spray 2 sprays both nostrils twice daily  Return in 1 week if still with symptoms